# Patient Record
Sex: FEMALE | Race: WHITE | NOT HISPANIC OR LATINO | URBAN - METROPOLITAN AREA
[De-identification: names, ages, dates, MRNs, and addresses within clinical notes are randomized per-mention and may not be internally consistent; named-entity substitution may affect disease eponyms.]

---

## 2024-01-29 ENCOUNTER — HOSPITAL ENCOUNTER (INPATIENT)
Facility: HOSPITAL | Age: 29
LOS: 11 days | Discharge: HOME | DRG: 832 | End: 2024-02-09
Attending: PSYCHIATRY & NEUROLOGY | Admitting: PSYCHIATRY & NEUROLOGY
Payer: MEDICAID

## 2024-01-29 DIAGNOSIS — F32.A DEPRESSION: ICD-10-CM

## 2024-01-29 DIAGNOSIS — F32.A DEPRESSION, UNSPECIFIED DEPRESSION TYPE: Primary | ICD-10-CM

## 2024-01-29 PROCEDURE — 83036 HEMOGLOBIN GLYCOSYLATED A1C: CPT | Performed by: PSYCHIATRY & NEUROLOGY

## 2024-01-29 PROCEDURE — 11400000 HC PSYCH PRIVATE ROOM

## 2024-01-29 PROCEDURE — 80061 LIPID PANEL: CPT | Performed by: PSYCHIATRY & NEUROLOGY

## 2024-01-29 RX ORDER — ONDANSETRON 4 MG/1
4 TABLET, ORALLY DISINTEGRATING ORAL EVERY 8 HOURS PRN
Status: DISCONTINUED | OUTPATIENT
Start: 2024-01-29 | End: 2024-02-09 | Stop reason: HOSPADM

## 2024-01-29 RX ORDER — PROMETHAZINE HYDROCHLORIDE 25 MG/1
25 TABLET ORAL EVERY 6 HOURS PRN
Status: DISCONTINUED | OUTPATIENT
Start: 2024-01-29 | End: 2024-02-09 | Stop reason: HOSPADM

## 2024-01-29 RX ORDER — OLANZAPINE 10 MG/2ML
10 INJECTION, POWDER, FOR SOLUTION INTRAMUSCULAR EVERY 8 HOURS PRN
Status: DISCONTINUED | OUTPATIENT
Start: 2024-01-29 | End: 2024-02-09 | Stop reason: HOSPADM

## 2024-01-29 RX ORDER — IBUPROFEN 200 MG
1 TABLET ORAL DAILY PRN
Status: DISCONTINUED | OUTPATIENT
Start: 2024-01-29 | End: 2024-02-09 | Stop reason: HOSPADM

## 2024-01-29 RX ORDER — BENZTROPINE MESYLATE 1 MG/ML
2 INJECTION, SOLUTION INTRAMUSCULAR; INTRAVENOUS EVERY 8 HOURS PRN
Status: DISCONTINUED | OUTPATIENT
Start: 2024-01-29 | End: 2024-02-09 | Stop reason: HOSPADM

## 2024-01-29 RX ORDER — BENZONATATE 100 MG/1
100 CAPSULE ORAL 3 TIMES DAILY PRN
Status: DISCONTINUED | OUTPATIENT
Start: 2024-01-29 | End: 2024-02-09 | Stop reason: HOSPADM

## 2024-01-29 RX ORDER — ALUMINUM HYDROXIDE, MAGNESIUM HYDROXIDE, AND SIMETHICONE 1200; 120; 1200 MG/30ML; MG/30ML; MG/30ML
30 SUSPENSION ORAL EVERY 6 HOURS PRN
Status: DISCONTINUED | OUTPATIENT
Start: 2024-01-29 | End: 2024-02-09 | Stop reason: HOSPADM

## 2024-01-29 RX ORDER — FLUOXETINE HYDROCHLORIDE 20 MG/1
20 CAPSULE ORAL DAILY
Status: ON HOLD | COMMUNITY
End: 2024-01-30

## 2024-01-29 RX ORDER — HYDROXYZINE PAMOATE 50 MG/1
50 CAPSULE ORAL EVERY 6 HOURS PRN
Status: DISCONTINUED | OUTPATIENT
Start: 2024-01-29 | End: 2024-02-09 | Stop reason: HOSPADM

## 2024-01-29 RX ORDER — ACETAMINOPHEN 325 MG/1
650 TABLET ORAL EVERY 6 HOURS PRN
Status: DISCONTINUED | OUTPATIENT
Start: 2024-01-29 | End: 2024-02-09 | Stop reason: HOSPADM

## 2024-01-29 RX ORDER — OLANZAPINE 10 MG/1
10 TABLET ORAL EVERY 8 HOURS PRN
Status: DISCONTINUED | OUTPATIENT
Start: 2024-01-29 | End: 2024-02-09 | Stop reason: HOSPADM

## 2024-01-29 RX ORDER — LOPERAMIDE HYDROCHLORIDE 2 MG/1
2 CAPSULE ORAL
Status: DISCONTINUED | OUTPATIENT
Start: 2024-01-29 | End: 2024-02-09 | Stop reason: HOSPADM

## 2024-01-30 PROBLEM — Z34.90 PREGNANCY: Status: ACTIVE | Noted: 2024-01-30

## 2024-01-30 PROBLEM — F32.A DEPRESSION: Status: ACTIVE | Noted: 2024-01-30

## 2024-01-30 LAB
CHOLEST SERPL-MCNC: 270 MG/DL (ref 120–199)
CHOLEST/HDLC SERPL: 4.7 {RATIO} (ref 2–5)
ESTIMATED AVG GLUCOSE: 94 MG/DL (ref 68–131)
HBA1C MFR BLD: 4.9 % (ref 4–5.6)
HDLC SERPL-MCNC: 58 MG/DL (ref 40–75)
HDLC SERPL: 21.5 % (ref 20–50)
LDLC SERPL CALC-MCNC: 172.2 MG/DL (ref 63–159)
NONHDLC SERPL-MCNC: 212 MG/DL
TRIGL SERPL-MCNC: 199 MG/DL (ref 30–150)

## 2024-01-30 PROCEDURE — 99231 SBSQ HOSP IP/OBS SF/LOW 25: CPT | Mod: ,,, | Performed by: PHYSICIAN ASSISTANT

## 2024-01-30 PROCEDURE — 99223 1ST HOSP IP/OBS HIGH 75: CPT | Mod: ,,, | Performed by: PSYCHIATRY & NEUROLOGY

## 2024-01-30 PROCEDURE — 90836 PSYTX W PT W E/M 45 MIN: CPT | Mod: ,,, | Performed by: PSYCHIATRY & NEUROLOGY

## 2024-01-30 PROCEDURE — 11400000 HC PSYCH PRIVATE ROOM

## 2024-01-30 PROCEDURE — 25000003 PHARM REV CODE 250: Performed by: PSYCHIATRY & NEUROLOGY

## 2024-01-30 RX ORDER — PRENATAL WITH FERROUS FUM AND FOLIC ACID 3080; 920; 120; 400; 22; 1.84; 3; 20; 10; 1; 12; 200; 27; 25; 2 [IU]/1; [IU]/1; MG/1; [IU]/1; MG/1; MG/1; MG/1; MG/1; MG/1; MG/1; UG/1; MG/1; MG/1; MG/1; MG/1
1 TABLET ORAL DAILY
Status: DISCONTINUED | OUTPATIENT
Start: 2024-01-30 | End: 2024-02-09 | Stop reason: HOSPADM

## 2024-01-30 RX ORDER — SERTRALINE HYDROCHLORIDE 25 MG/1
25 TABLET, FILM COATED ORAL DAILY
Status: DISCONTINUED | OUTPATIENT
Start: 2024-01-30 | End: 2024-01-31

## 2024-01-30 RX ADMIN — PRENATAL VIT W/ FE FUMARATE-FA TAB 27-0.8 MG 1 TABLET: 27-0.8 TAB at 10:01

## 2024-01-30 RX ADMIN — SERTRALINE HYDROCHLORIDE 25 MG: 25 TABLET ORAL at 10:01

## 2024-01-30 RX ADMIN — HYDROXYZINE PAMOATE 50 MG: 50 CAPSULE ORAL at 09:01

## 2024-01-30 NOTE — NURSING
Patient arrived to [facility] from [facility]. Via wheelchair, escorted by hospital security and MHT. PEC with patient. Scanned, no contraband found. Ambulated to assessment room without difficulty.

## 2024-01-30 NOTE — PROGRESS NOTES
"   01/30/24 1445   [facility] Group Therapy   Group Name Therapeutic Recreation   Specific Interventions Cognitive Stimulation Training   Participation Level Appropriate;Sharing   Participation Quality Cooperative;Social   Insight/Motivation Applies New Skills;Good   Affect/Mood Display Depressed   Cognition Alert   Psychomotor WNL     Patient presents cooperative, and willing to participate, reports a depressed, feel a little better" mood, verbalized she participated but "something was still running in the background." Patient reports she gets into a cycle of wandering, questioning and repetitive fixation... I notice my OCD coming back."   "

## 2024-01-30 NOTE — NURSING
OB TRIAGE ASSESSMENT    RE: Ellen Rose  MRN:  [id number]  :  1995  AGE:  28 y.o.    Date:  2024      NST:    150 BPM baseline  Variability:  Good {> 6 bpm)  Accelerations:  Reactive  Decelerations:  none    [place]: contractions irregular with uterine irritability    ED RN notified of results.      Neisha Basurto   24; 1700

## 2024-01-30 NOTE — PLAN OF CARE
Pt is calm and cooperative.  Denies any S/I or H/I.  Denies any N/V, just some mild body aches reported.  Denies abdominal pain.  Mood is better today, she reports feeling relieved since being admitted.  She is compliant and appreciative.  Explained to pt that OB has been consulted and they will be coming to assess her today as well, understanding verbalized.  She is interacting appropriately on the unit.  Demeanor is pleasant.  Contracted for safety.  No acute distress apparent at this time, will continue to monitor.

## 2024-01-30 NOTE — HPI
Patient is a 28 y.o. female with medical history of depression and pregnancy who was admitted to [facility] for suicidal ideation.  Hospital medicine consulted for medical management.

## 2024-01-30 NOTE — MEDICAL/APP STUDENT
PSYCHIATRY INPATIENT ADMISSION NOTE - H & P      1/30/2024 8:16 AM   Ellen Rose   1995   [id number]         DATE OF ADMISSION: 1/29/2024  8:30 PM    SITE: [facility]    CURRENT LEGAL STATUS: PEC and/or CEC      HISTORY    CHIEF COMPLAINT   Ellen Rose is a 28 y.o. female with a past psychiatric history of {psych axis 1:98968} currently admitted to the inpatient unit with the following chief complaint: {AMB PSY INITIAL COMPLAINT:20458}    HPI   The patient was seen and examined. The chart was reviewed.    The patient presented to the ER on 1/29/2024 .    The patient was medically cleared and admitted to the U.    Ellen is a 29yo woman who is 35 weeks primagravida. Pt has a significant past psych history of OCD with a hospitalization at age 25. Pt had been diagnosed with OCD that was well managed with medications. Pt became pregnant and stopped taking medication on obstetricians advice early in pregnancy. Pt started to experience anxiety and depression around 20 weeks. For past 2 weeks ago she has been disabled by obsessional thought to a point where she does not want to get out of bed and is indifferent to life. Obsessions are so stressful that she does not want to wake up. She reports no plan to commit suicide or homicide but feels she would be better off not alive. Pt feels she needs significant support that cannot be provided by her support network and requires hospitalization. Pt reports to significant history of hoarding. Pt is  to  and has been experiencing marital stress due to hoarding. Father, mother, half-sister kristy as well.     ***      Symptoms of Depression: diminished mood - Yes, loss of interest/anhedonia - Yes;  recurrent - Yes, >14 days - Yes, diminished energy - Yes, change in sleep - No, change in appetite - No, diminished concentration or cognition or indecisiveness - No, PMA/R -  No, excessive guilt or hopelessness or worthlessness - Yes, suicidal ideations -  "Yes    Changes in Sleep: trouble with initiation- No, maintenance, - No early morning awakening with inability to return to sleep - No, hypersomnolence - No    Suicidal- active/passive ideations - Yes/passive, organized plans, future intentions - No    Homicidal ideations: active/passive ideations - No, organized plans, future intentions - No    Symptoms of psychosis: hallucinations - No, delusions - No, disorganized speech - No, disorganized behavior or abnormal motor behavior - No, or negative symptoms (diminshed emotional expression, avolition, anhedonia, alogia, asociality) - Yes, active phase symptoms >1 month - No5, continuous signs of illness > 6 months - No, since onset of illness decreased level of functioning present - Yes    Symptoms of macario or hypomania: elevated, expansive, or irritable mood with increased energy or activity - No; > 4 days - No,  >7 days - No; with inflated self-esteem or grandiosity - No, decreased need for sleep - No, increased rate of speech - No, FOI or racing thoughts - Yes, distractibility - No, increased goal directed activity or PMA - No, risky/disinhibited behavior - No    Symptoms of ABRIL: excessive anxiety/worry/fear, more days than not, about numerous issues - No, ongoing for >6 months - No, difficult to control - No, with restlessness - No, fatigue - No, poor concentration - No, irritability - No, muscle tension - No, sleep disturbance - No; causes functionally impairing distress - No    Symptoms of Panic Disorder: recurrent panic attacks (palpitations/heart racing, sweating, shakiness, dyspnea, choking, chest pain/discomfort, Gi symptoms, dizzy/lightheadedness, hot/col flashes, paresthesias, derealization, fear of losing control or fear of dying or fear of "going crazy") - Yes, precipitated - Yes, un-precipitated - No, source of worry and/or behavioral changes secondary for 1 month or longer- Yes, agoraphobia - No    Symptoms of PTSD: h/o trauma exposure - No; " re-experiencing/intrusive symptoms - No, avoidant behavior - No, 2 or more negative alterations in cognition or mood - No, 2 or more hyperarousal symptoms - No; with dissociative symptoms - No, ongoing for 1 or more  months - No    Symptoms of OCD: obsessions (recurrent thoughts/urges/images; intrusive and/or unwanted; uses other thoughts/actions to suppress) - Yes; compulsions (repetitive behaviors used to lower distress/anxiety/obsessions) - Yes, time-consuming (over 1 hour per day) or cause significant distress/impairment - - Yes    Symptoms of Anorexia: restriction of caloric intake leading to significantly low body weight - No, intense fear of gaining weight or persistent behavior that interferes with weight gain even thought at a significantly low weight - No, disturbance in the way in which one's body weight or shape is experienced, undue influence of body weight or shape on self evaluation, or persistent lack of recognition of the seriousness of the current low body weight - No    Symptoms of Bulimia: recurrent episodes of binge eating (definitely larger amount  than what others would eat and lack of a sense of control over eating during episode) - No, recurrent inappropriate compensatory behaviors in order to prevent weight gain (fasting, medications, exercise, vomiting) - No, binges and compensatory behaviors both occur on average at least once a week for 3 months - No, self evaluations is unduly influenced by body shape/weight- - No    Symptoms of Binge eating: recurrent episodes of binge eating (definitely larger amount than what others would eat and lack of a sense of control over eating during episode) - No, 3 or more of following (eating much more rapidly, eating until uncomfortably full, large amounts when not hungry, eating alone because of embarrassed by how much,  feeling disgusted with oneself, depressed or very guilty afterward) - No, distress regarding binges - No, binges occur on average at  least once a week for 3 months - No      Substance/s:  Taken in larger amounts or over longer periods than intended: No,  Persistent desire or unsuccessful attempts to cut down or stop: No,  Great deal of time spent seeking, using or recovering from: No,  Craving or strong desire to use: No,  Recurrent use despite failure to meet major role obligation: No,  Continued use despite persistent or recurrent social/interparsonal issues due to use: No,  Important social/work/recreational activities given up due to use: No,  Recurrent use in physically hazardous situations: No,  Continued use despite knowledge of persistent physical or psychological problem: No,  Tolerance (either increased need or diminished effect): No,      Psychotherapy:  Target symptoms: anxiety   Why chosen therapy is appropriate versus another modality: {reason:79896}  Outcome monitoring methods: {methods:34073}  Therapeutic intervention type: {types:93879}  Topics discussed/themes: {Topics:20403}  The patient's response to the intervention is {PSY INTERVENTION RESPONSE:01654}. The patient's progress toward treatment goals is {Progress:20262}.   Duration of intervention: *** minutes.      PAST PSYCHIATRIC HISTORY  Previous Psychiatric Hospitalizations: Yes  Previous SI/HI: Yes,  Previous Suicide Attempts: No,   Previous Medication Trials: No,  Psychiatric Care (current & past): Yes,  History of Psychotherapy: Yes,  History of Violence: No,  History of sexual/physical abuse: No,    PAST MEDICAL & SURGICAL HISTORY   Past Medical History:   Diagnosis Date    Major depressive disorder, single episode, unspecified     OCD (obsessive compulsive disorder)      No past surgical history on file.  ***    CURRENT PSYCH MEDICATION REGIMEN   ***  Current Medication side effects:  ***  Current Medication compliance:  ***    Previous psych meds trials  ******    Home Meds:   Prior to Admission medications    Medication Sig Start Date End Date Taking? Authorizing  Provider   FLUoxetine 20 MG capsule Take 20 mg by mouth once daily.    Provider, Historical       ***  OTC Meds: ***    Scheduled Meds:    PRN Meds: acetaminophen, aluminum-magnesium hydroxide-simethicone, benzonatate, benztropine mesylate, hydrOXYzine pamoate, loperamide, nicotine, OLANZapine **AND** OLANZapine, ondansetron, promethazine   Psychotherapeutics (From admission, onward)      Start     Stop Route Frequency Ordered    01/29/24 2053  OLANZapine tablet 10 mg  (Olanzapine PRN (</= 66 yo))        See Hyperspace for full Linked Orders Report.    -- Oral Every 8 hours PRN 01/29/24 2053 01/29/24 2053  OLANZapine injection 10 mg  (Olanzapine PRN (</= 66 yo))        See Hyperspace for full Linked Orders Report.    -- IM Every 8 hours PRN 01/29/24 2053            ALLERGIES   Review of patient's allergies indicates:  No Known Allergies    NEUROLOGIC HISTORY  Seizures: No  Head trauma: No    SOCIAL HISTORY:  Developmental/Childhood:Delayed in achieving developmental milestone walked late  Education:High School Diploma  Employment Status/Finances:Employed   Relationship Status/Sexual Orientation:   Children: 0  Housing Status: Home    history:  NO   Access to Firearms: YES:stoerd at mothers home ***    ;  Locked up? NO  Pentecostalism:Agnostic  Recreational activities:***outdoors crafting    SUBSTANCE ABUSE HISTORY   Recreational Drugs: {recreationaldrugs:64523}   Use of Alcohol: minimal use  Rehab History:no   Tobacco Use:no    LEGAL HISTORY:   Past charges/incarcerations: NO  Pending charges:NO    FAMILY PSYCHIATRIC HISTORY   History reviewed. No pertinent family history.    ***       ROS  ROS      EXAMINATION    PHYSICAL EXAM  Reviewed note/exam by  *** from *** at ***    VITALS   Vitals:    01/30/24 0800   BP: (!) 101/59   Pulse: 99   Resp: 19   Temp: 97.4 °F (36.3 °C)        Body mass index is 34.27 kg/m².        PAIN  2/10 mid back pain  Subjective report of pain matches objective signs and  symptoms: Yes    LABORATORY DATA   Recent Results (from the past 72 hour(s))   Urinalysis, Reflex to Urine Culture Urine, Clean Catch    Collection Time: 01/29/24  4:41 PM    Specimen: Urine   Result Value Ref Range    Specimen UA Urine, Clean Catch     Color, UA Luna Yellow, Straw, Luna    Appearance, UA Hazy (A) Clear    pH, UA 7.0 5.0 - 8.0    Specific Gravity, UA >=1.030 (A) 1.005 - 1.030    Protein, UA 1+ (A) Negative    Glucose, UA Trace (A) Negative    Ketones, UA 3+ (A) Negative    Bilirubin (UA) 1+ (A) Negative    Occult Blood UA Trace (A) Negative    Nitrite, UA Negative Negative    Urobilinogen, UA 1.0 <2.0 EU/dL    Leukocytes, UA Trace (A) Negative   Drug screen panel, emergency    Collection Time: 01/29/24  4:41 PM   Result Value Ref Range    Benzodiazepines Negative Negative    Methadone metabolites Negative Negative    Cocaine (Metab.) Negative Negative    Opiate Scrn, Ur Negative Negative    Barbiturate Screen, Ur Negative Negative    Amphetamine Screen, Ur Negative Negative    THC Negative Negative    Phencyclidine Negative Negative    Creatinine, Urine 260.7 15.0 - 325.0 mg/dL    Toxicology Information SEE COMMENT    Pregnancy, urine rapid    Collection Time: 01/29/24  4:41 PM   Result Value Ref Range    Preg Test, Ur Positive (A)    Urinalysis Microscopic    Collection Time: 01/29/24  4:41 PM   Result Value Ref Range    RBC, UA 2 0 - 4 /hpf    WBC, UA 11 (H) 0 - 5 /hpf    Bacteria Moderate (A) None-Occ /hpf    Squam Epithel, UA 50 /hpf    Hyaline Casts, UA 0 0-1/lpf /lpf    Microscopic Comment SEE COMMENT    CBC auto differential    Collection Time: 01/29/24  5:03 PM   Result Value Ref Range    WBC 7.56 3.90 - 12.70 K/uL    RBC 3.92 (L) 4.00 - 5.40 M/uL    Hemoglobin 11.6 (L) 12.0 - 16.0 g/dL    Hematocrit 34.3 (L) 37.0 - 48.5 %    MCV 88 82 - 98 fL    MCH 29.6 27.0 - 31.0 pg    MCHC 33.8 32.0 - 36.0 g/dL    RDW 12.8 11.5 - 14.5 %    Platelets 344 150 - 450 K/uL    MPV 10.1 9.2 - 12.9 fL     "Immature Granulocytes 0.4 0.0 - 0.5 %    Gran # (ANC) 5.6 1.8 - 7.7 K/uL    Immature Grans (Abs) 0.03 0.00 - 0.04 K/uL    Lymph # 1.3 1.0 - 4.8 K/uL    Mono # 0.6 0.3 - 1.0 K/uL    Eos # 0.0 0.0 - 0.5 K/uL    Baso # 0.02 0.00 - 0.20 K/uL    nRBC 0 0 /100 WBC    Gran % 73.8 (H) 38.0 - 73.0 %    Lymph % 17.6 (L) 18.0 - 48.0 %    Mono % 7.8 4.0 - 15.0 %    Eosinophil % 0.1 0.0 - 8.0 %    Basophil % 0.3 0.0 - 1.9 %    Differential Method Automated    Comprehensive metabolic panel    Collection Time: 01/29/24  5:03 PM   Result Value Ref Range    Sodium 138 136 - 145 mmol/L    Potassium 3.9 3.5 - 5.1 mmol/L    Chloride 107 95 - 110 mmol/L    CO2 19 (L) 23 - 29 mmol/L    Glucose 116 (H) 70 - 110 mg/dL    BUN 9 6 - 20 mg/dL    Creatinine 0.7 0.5 - 1.4 mg/dL    Calcium 9.0 8.7 - 10.5 mg/dL    Total Protein 6.3 6.0 - 8.4 g/dL    Albumin 2.6 (L) 3.5 - 5.2 g/dL    Total Bilirubin 0.6 0.1 - 1.0 mg/dL    Alkaline Phosphatase 98 55 - 135 U/L    AST 50 (H) 10 - 40 U/L    ALT 90 (H) 10 - 44 U/L    eGFR >60 >60 mL/min/1.73 m^2    Anion Gap 12 8 - 16 mmol/L   TSH    Collection Time: 01/29/24  5:03 PM   Result Value Ref Range    TSH 1.210 0.400 - 4.000 uIU/mL   Ethanol    Collection Time: 01/29/24  5:03 PM   Result Value Ref Range    Alcohol, Serum <10 <10 mg/dL   Acetaminophen level    Collection Time: 01/29/24  5:03 PM   Result Value Ref Range    Acetaminophen (Tylenol), Serum <3.0 (L) 10.0 - 20.0 ug/mL   Lipid Panel    Collection Time: 01/29/24  5:03 PM   Result Value Ref Range    Cholesterol 270 (H) 120 - 199 mg/dL    Triglycerides 199 (H) 30 - 150 mg/dL    HDL 58 40 - 75 mg/dL    LDL Cholesterol 172.2 (H) 63.0 - 159.0 mg/dL    HDL/Cholesterol Ratio 21.5 20.0 - 50.0 %    Total Cholesterol/HDL Ratio 4.7 2.0 - 5.0    Non-HDL Cholesterol 212 mg/dL      No results found for: "PHENYTOIN", "PHENOBARB", "VALPROATE", "CBMZ"        CONSTITUTIONAL  General Appearance: unremarkable, age appropriate, normal weight, well nourished, casually " dressed    MUSCULOSKELETAL  Muscle Strength and Tone:no tremor, no tic  Abnormal Involuntary Movements: No  Gait and Station: non-ataxic    PSYCHIATRIC   Level of Consciousness: awake and alert   Orientation: person, place, time, and situation  Grooming: Casually dressed and Well groomed  Psychomotor Behavior: normal, cooperative, friendly and cooperative, eye contact normal  Speech: normal tone, normal rate, normal pitch, normal volume  Language: grossly intact  Mood: neutral and sad  Affect: Consistent with mood  Thought Process: linear, logical  Associations: intact   Thought Content: denies SI and denies HI  Perceptions: {toperceptions:19977}  Memory: Able to recall past events, Remote intact, and Recent intact  Attention:Attends to interview without distraction  Fund of Knowledge: Aware of current events and Vocabulary appropriate   Estimate if Intelligence:  Average based on work/education history, vocabulary and mental status exam  Insight: has awareness of illness  Judgment: behavior is adequate to circumstances      PSYCHOSOCIAL    PSYCHOSOCIAL STRESSORS   family, financial, marital, and occupational    FUNCTIONING RELATIONSHIPS   good support system, strained with spouse or significant others, and good relationship with spouse or significant other    STRENGTHS AND LIABILITIES   Strength: Patient accepts guidance/feedback, Strength: Patient is expressive/articulate., Strength: Patient is intelligent., Strength: Patient is motivated for change., Strength: Patient is physically healthy., Strength: Patient has positive support network., Strength: Patient has reasonable judgment., Strength: Patient is stable.    Is the patient aware of the biomedical complications associated with substance abuse and mental illness? yes    Does the patient have an Advance Directive for Mental Health treatment? no  (If yes, inform patient to bring copy.)        MEDICAL DECISION MAKING        ASSESSMENT       ***      PROBLEM LIST  AND MANAGEMENT PLANS    ***      PRESCRIPTION DRUG MANAGEMENT  Compliance: {Responses; yes/no/unknown:74}  Side Effects: {Responses; yes/no/unknown:74}  Regimen Adjustments: see above    Discussed diagnosis, risks and benefits of proposed treatment vs alternative treatments vs no treatment, potential side effects of these treatments and the inherent unpredictability of treatment. The patient expresses understanding of the above and displays the capacity to agree with this treatment given said understanding. Patient also agrees that, currently, the benefits outweigh the risks and would like to pursue/continue treatment at this time.    Any medications being used off-label were discussed with the patient inclusive of the evidence base for the use of the medications and consent was obtained for the off-label use of the medication.         DIAGNOSTIC TESTING  Labs reviewed with patient; follow up pending labs    Disposition:  -Will attempt to obtain outside psychiatric records if available  -SW to assist with aftercare planning and collateral  -Once stable discharge home with outpatient follow up care and/or rehab  -Continue inpatient treatment under a PEC and/or CEC for danger to self/ danger to others/grave disability as evident by {PSY IP JUSTIFICATION FOR CONTINUED ACUTE CARE HOSPITALIZATION:86659}        Diaz Justice, MS3  Psychiatry

## 2024-01-30 NOTE — NURSING
Pt sleeping at this time, slept 4.5 hrs with no awakenings. NAD. Resp even and unlabored.Pathways clear,bed in low position. Q 15 min safety check ongoing.All precautions maintained.

## 2024-01-30 NOTE — CONSULTS
[facility] Hospital Medicine  Consult Note    Patient Name: Ellen Rose  MRN: [id number]  Admission Date: 1/29/2024  Hospital Length of Stay: 1 days  Attending Physician: Ray Sumner MD   Primary Care Provider: [facility]           Patient information was obtained from patient and ER records.     Inpatient consult to [facility] for History and Physical  Consult performed by: Shannon Swan PA-C  Consult ordered by: Ray Sumner MD        Subjective:     Principal Problem: Depression    Chief Complaint: No chief complaint on file.       HPI: Patient is a 28 y.o. female with medical history of depression and pregnancy who was admitted to [facility] for suicidal ideation.  Hospital medicine consulted for medical management.        Past Medical History:   Diagnosis Date    Major depressive disorder, single episode, unspecified     OCD (obsessive compulsive disorder)        No past surgical history on file.    Review of patient's allergies indicates:  No Known Allergies    Current Facility-Administered Medications on File Prior to Encounter   Medication    [DISCONTINUED] cefpodoxime tablet 200 mg     Current Outpatient Medications on File Prior to Encounter   Medication Sig    [DISCONTINUED] FLUoxetine 20 MG capsule Take 20 mg by mouth once daily.     Family History    None       Tobacco Use    Smoking status: Never    Smokeless tobacco: Never   Substance and Sexual Activity    Alcohol use: Not Currently    Drug use: Not Currently    Sexual activity: Yes     Partners: Male     Review of Systems   Constitutional:  Negative for chills and fever.   HENT:  Negative for congestion and drooling.    Respiratory:  Negative for cough and shortness of breath.    Cardiovascular:  Negative for chest pain and leg swelling.   Gastrointestinal:  Negative for constipation, diarrhea, nausea and vomiting.   Genitourinary:  Negative for difficulty urinating and dysuria.    Musculoskeletal:  Negative for arthralgias and gait problem.     Objective:     Vital Signs (Most Recent):  Temp: 97.4 °F (36.3 °C) (01/30/24 0800)  Pulse: 99 (01/30/24 0800)  Resp: 19 (01/30/24 0800)  BP: (!) 101/59 (01/30/24 0800)  SpO2: 98 % (01/29/24 2051) Vital Signs (24h Range):  Temp:  [97.3 °F (36.3 °C)-97.4 °F (36.3 °C)] 97.4 °F (36.3 °C)  Pulse:  [] 99  Resp:  [18-20] 19  SpO2:  [97 %-99 %] 98 %  BP: (101-125)/(59-75) 101/59     Weight: 79.6 kg (175 lb 7.8 oz)  Body mass index is 34.27 kg/m².     Physical Exam  Constitutional:       Appearance: Normal appearance.   HENT:      Head: Normocephalic and atraumatic.   Cardiovascular:      Rate and Rhythm: Normal rate and regular rhythm.   Pulmonary:      Effort: Pulmonary effort is normal. No respiratory distress.      Breath sounds: Normal breath sounds.   Abdominal:      General: Abdomen is flat. There is no distension.      Palpations: Abdomen is soft.   Musculoskeletal:      Right lower leg: No edema.      Left lower leg: No edema.   Skin:     General: Skin is warm and dry.   Neurological:      Mental Status: She is alert and oriented to person, place, and time. Mental status is at baseline.          Significant Labs: UPT  Positive UPT   U/A  Negative for UTI   UDS  Results for orders placed or performed during the hospital encounter of 01/29/24   Drug screen panel, emergency   Result Value Ref Range    Benzodiazepines Negative Negative    Methadone metabolites Negative Negative    Cocaine (Metab.) Negative Negative    Opiate Scrn, Ur Negative Negative    Barbiturate Screen, Ur Negative Negative    Amphetamine Screen, Ur Negative Negative    THC Negative Negative    Phencyclidine Negative Negative    Creatinine, Urine 260.7 15.0 - 325.0 mg/dL    Toxicology Information SEE COMMENT      CBC  Results for orders placed or performed during the hospital encounter of 01/29/24   CBC auto differential   Result Value Ref Range    WBC 7.56 3.90 - 12.70 K/uL     RBC 3.92 (L) 4.00 - 5.40 M/uL    Hemoglobin 11.6 (L) 12.0 - 16.0 g/dL    Hematocrit 34.3 (L) 37.0 - 48.5 %    MCV 88 82 - 98 fL    MCH 29.6 27.0 - 31.0 pg    MCHC 33.8 32.0 - 36.0 g/dL    RDW 12.8 11.5 - 14.5 %    Platelets 344 150 - 450 K/uL    MPV 10.1 9.2 - 12.9 fL    Immature Granulocytes 0.4 0.0 - 0.5 %    Gran # (ANC) 5.6 1.8 - 7.7 K/uL    Immature Grans (Abs) 0.03 0.00 - 0.04 K/uL    Lymph # 1.3 1.0 - 4.8 K/uL    Mono # 0.6 0.3 - 1.0 K/uL    Eos # 0.0 0.0 - 0.5 K/uL    Baso # 0.02 0.00 - 0.20 K/uL    nRBC 0 0 /100 WBC    Gran % 73.8 (H) 38.0 - 73.0 %    Lymph % 17.6 (L) 18.0 - 48.0 %    Mono % 7.8 4.0 - 15.0 %    Eosinophil % 0.1 0.0 - 8.0 %    Basophil % 0.3 0.0 - 1.9 %    Differential Method Automated      CMP  Results for orders placed or performed during the hospital encounter of 01/29/24   Comprehensive metabolic panel   Result Value Ref Range    Sodium 138 136 - 145 mmol/L    Potassium 3.9 3.5 - 5.1 mmol/L    Chloride 107 95 - 110 mmol/L    CO2 19 (L) 23 - 29 mmol/L    Glucose 116 (H) 70 - 110 mg/dL    BUN 9 6 - 20 mg/dL    Creatinine 0.7 0.5 - 1.4 mg/dL    Calcium 9.0 8.7 - 10.5 mg/dL    Total Protein 6.3 6.0 - 8.4 g/dL    Albumin 2.6 (L) 3.5 - 5.2 g/dL    Total Bilirubin 0.6 0.1 - 1.0 mg/dL    Alkaline Phosphatase 98 55 - 135 U/L    AST 50 (H) 10 - 40 U/L    ALT 90 (H) 10 - 44 U/L    eGFR >60 >60 mL/min/1.73 m^2    Anion Gap 12 8 - 16 mmol/L     TSH  Results for orders placed or performed during the hospital encounter of 01/29/24   TSH   Result Value Ref Range    TSH 1.210 0.400 - 4.000 uIU/mL     ETOH  Results for orders placed or performed during the hospital encounter of 01/29/24   Ethanol   Result Value Ref Range    Alcohol, Serum <10 <10 mg/dL     Salicylate  No results found for this or any previous visit.  Acetaminophen  Results for orders placed or performed during the hospital encounter of 01/29/24   Acetaminophen level   Result Value Ref Range    Acetaminophen (Tylenol), Serum <3.0 (L) 10.0  - 20.0 ug/mL             Significant Imaging: none  Assessment/Plan:     * Depression  Defer to psych         Pregnancy  Positive UPT  F/u with outpatient obgyn        VTE Risk Mitigation (From admission, onward)      None                Thank you for your consult. I will sign off. Please contact us if you have any additional questions.    Shannon Swan PA-C  Department of Hospital Medicine   [facility]

## 2024-01-30 NOTE — NURSING
"Patient reports to [facility] for 7/10 depression and 5/10 anxiety. Reports that as a child she had OCD tendencies. However, as an adult she has been more depressed due to trying to control her OCD symptoms. She reports that she ruminates on conversations that happened years ago and she could not handle her symptoms. When she got pregnant, she had to get off of her medications (fluvoxamine, hydroxyzine, bupropion, and Ambien). These medications were substituted with fluoxetine. She has been to the ED twice in her community for psych placement, but states that nobody can accept her due to being pregnant. They placed her on hydroxyzine PRN and Unisom each evening, but they have not worked well for her. Upon getting closer to her delivery date, she is becoming anxious and "numb" to giving birth. Reports that she wishes she could go to sleep and not wake up, but does not have any plans to harm herself. States that she sometimes has resentment towards her baby because "now I can't do anything even if I wanted to". She has a history of being on a U for her OCD and depression. Denies HI. Denies hallucinations. Reports decreased sleep and appetite. UDS negative. No alcohol use or smoking. Oriented pt to unit. NADN. Remains calm and cooperative. Safety precautions remain in place.  "

## 2024-01-30 NOTE — H&P
"PSYCHIATRY INPATIENT ADMISSION NOTE - H & P      2024 8:45 AM   Ellen Rose   1995   [id number]         DATE OF ADMISSION: 2024  8:30 PM    SITE: [facility]    CURRENT LEGAL STATUS: PEC and/or CEC      HISTORY    CHIEF COMPLAINT   Ellen Rose is a 28 y.o. female with a past psychiatric history of OCD and depression currently admitted to the inpatient unit with the following chief complaint: obsessive thinking and depression with passive SI, "I needed to get back on medications."    HPI   The patient was seen and examined. The chart was reviewed.    The patient presented to the ER on 2024 . Per staff notes:  -Patient is 35 weeks pregnant, comes to ER today states she feels like she needs additional meds for her OCD and depression, states she does have a feeling of haplessness and would not mind never waking up again   -28-year-old female  at 35 weeks presenting with depression and worsening OCD tendencies.  Patient reports that she was taken off her psychiatric medications prior to her pregnancy, but in the last few weeks has been feeling more depressed.  She states that she feels resentment towards her unborn child, and has a feeling of not wanting to wake up in the morning.  Patient denies any abdominal pain, vaginal bleeding, vaginal discharge.  No other complaints.   -Patient reports to [facility] for 7/10 depression and 5/10 anxiety. Reports that as a child she had OCD tendencies. However, as an adult she has been more depressed due to trying to control her OCD symptoms. She reports that she ruminates on conversations that happened years ago and she could not handle her symptoms. When she got pregnant, she had to get off of her medications (fluvoxamine, hydroxyzine, bupropion, and Ambien). These medications were substituted with fluoxetine. She has been to the ED twice in her community for psych placement, but states that nobody can accept her due to being pregnant. They placed her on " "hydroxyzine PRN and Unisom each evening, but they have not worked well for her. Upon getting closer to her delivery date, she is becoming anxious and "numb" to giving birth. Reports that she wishes she could go to sleep and not wake up, but does not have any plans to harm herself. States that she sometimes has resentment towards her baby because "now I can't do anything even if I wanted to". She has a history of being on a BHU for her OCD and depression. Denies HI. Denies hallucinations. Reports decreased sleep and appetite. UDS negative. No alcohol use or smoking.     The patient was medically cleared and admitted to the U.    The patient reports long standing issues with OCD and periodic depression. She was doing well until she got off of her medications secondly to pregnancy (currently at about 35 weeks; first pregnancy) . Symptoms recurred over the last month or so and quickly progressed. She developed passive SI and uncontrolled obsessive thinking.     She plans to try breastfeeding.     Symptoms of Depression: diminished mood - Yes, loss of interest/anhedonia - Yes;  recurrent - Yes, >14 days - Yes, diminished energy - Yes, change in sleep - No, change in appetite - No, diminished concentration or cognition or indecisiveness - No, PMA/R -  No, excessive guilt or hopelessness or worthlessness - Yes, suicidal ideations - Yes     Changes in Sleep: trouble with initiation- No, maintenance, - No early morning awakening with inability to return to sleep - No, hypersomnolence - No     Suicidal- active/passive ideations - Yes/passive, no active, organized plans- no, future intentions - No     Homicidal ideations: active/passive ideations - No, organized plans- no, future intentions - No     Symptoms of psychosis: hallucinations - No, delusions - No, disorganized speech - No, disorganized behavior or abnormal motor behavior - No, or negative symptoms (diminshed emotional expression, avolition, anhedonia, alogia, " "asociality) - no, active phase symptoms >1 month - No, continuous signs of illness > 6 months - No, since onset of illness decreased level of functioning present - no     Symptoms of macario or hypomania: elevated, expansive, or irritable mood with increased energy or activity - No; > 4 days - No,  >7 days - No; with inflated self-esteem or grandiosity - No, decreased need for sleep - No, increased rate of speech - No, FOI or racing thoughts - Yes, distractibility - No, increased goal directed activity or PMA - No, risky/disinhibited behavior - No     Symptoms of ABRIL: excessive anxiety/worry/fear, more days than not, about numerous issues - No, ongoing for >6 months - No, difficult to control - No, with restlessness - No, fatigue - No, poor concentration - No, irritability - No, muscle tension - No, sleep disturbance - No; causes functionally impairing distress - No     Symptoms of Panic Disorder: recurrent panic attacks (palpitations/heart racing, sweating, shakiness, dyspnea, choking, chest pain/discomfort, Gi symptoms, dizzy/lightheadedness, hot/col flashes, paresthesias, derealization, fear of losing control or fear of dying or fear of "going crazy") - Yes, precipitated - Yes, un-precipitated - No, source of worry and/or behavioral changes secondary for 1 month or longer- Yes, agoraphobia - No     Symptoms of PTSD: h/o trauma exposure - No; re-experiencing/intrusive symptoms - No, avoidant behavior - No, 2 or more negative alterations in cognition or mood - No, 2 or more hyperarousal symptoms - No; with dissociative symptoms - No, ongoing for 1 or more  months - No     Symptoms of OCD: obsessions (recurrent thoughts/urges/images; intrusive and/or unwanted; uses other thoughts/actions to suppress) - Yes; compulsions (repetitive behaviors used to lower distress/anxiety/obsessions) - Yes, time-consuming (over 1 hour per day) or cause significant distress/impairment - Yes     Symptoms of Anorexia: restriction of " caloric intake leading to significantly low body weight - No, intense fear of gaining weight or persistent behavior that interferes with weight gain even thought at a significantly low weight - No, disturbance in the way in which one's body weight or shape is experienced, undue influence of body weight or shape on self evaluation, or persistent lack of recognition of the seriousness of the current low body weight - No     Symptoms of Bulimia: recurrent episodes of binge eating (definitely larger amount  than what others would eat and lack of a sense of control over eating during episode) - No, recurrent inappropriate compensatory behaviors in order to prevent weight gain (fasting, medications, exercise, vomiting) - No, binges and compensatory behaviors both occur on average at least once a week for 3 months - No, self evaluations is unduly influenced by body shape/weight- - No     Symptoms of Binge eating: recurrent episodes of binge eating (definitely larger amount than what others would eat and lack of a sense of control over eating during episode) - No, 3 or more of following (eating much more rapidly, eating until uncomfortably full, large amounts when not hungry, eating alone because of embarrassed by how much,  feeling disgusted with oneself, depressed or very guilty afterward) - No, distress regarding binges - No, binges occur on average at least once a week for 3 months - No        Substance/s:  Taken in larger amounts or over longer periods than intended: No,  Persistent desire or unsuccessful attempts to cut down or stop: No,  Great deal of time spent seeking, using or recovering from: No,  Craving or strong desire to use: No,  Recurrent use despite failure to meet major role obligation: No,  Continued use despite persistent or recurrent social/interparsonal issues due to use: No,  Important social/work/recreational activities given up due to use: No,  Recurrent use in physically hazardous situations:  No,  Continued use despite knowledge of persistent physical or psychological problem: No,  Tolerance (either increased need or diminished effect): No,        Psychotherapy:  Target symptoms: depression, anxiety   Why chosen therapy is appropriate versus another modality: relevant to diagnosis, patient responds to this modality, evidence based practice  Outcome monitoring methods: self-report, observation  Therapeutic intervention type: insight oriented psychotherapy, behavior modifying psychotherapy, supportive psychotherapy, interactive psychotherapy  Topics discussed/themes: building skills sets for symptom management, symptom recognition  The patient's response to the intervention is accepting. The patient's progress toward treatment goals is fair.   Duration of intervention: 40 minutes.      PAST PSYCHIATRIC HISTORY  Previous Psychiatric Hospitalizations: Yes  Previous SI/HI: Yes,  Previous Suicide Attempts: No,   Previous Medication Trials: No,  Psychiatric Care (current & past): Yes,  History of Psychotherapy: Yes,  History of Violence: No,  History of sexual/physical abuse: No,    PAST MEDICAL & SURGICAL HISTORY   Past Medical History:   Diagnosis Date    Major depressive disorder, single episode, unspecified     OCD (obsessive compulsive disorder)      No past surgical history on file.      CURRENT PSYCH MEDICATION REGIMEN   prozac  Current Medication side effects:  no  Current Medication compliance:  yes    Previous psych meds trials  Luvox, vistaril, ambien and others    Home Meds:   Prior to Admission medications    Medication Sig Start Date End Date Taking? Authorizing Provider   FLUoxetine 20 MG capsule Take 20 mg by mouth once daily.    Provider, Historical         OTC Meds: none    Scheduled Meds:    PRN Meds: acetaminophen, aluminum-magnesium hydroxide-simethicone, benzonatate, benztropine mesylate, hydrOXYzine pamoate, loperamide, nicotine, OLANZapine **AND** OLANZapine, ondansetron, promethazine    Psychotherapeutics (From admission, onward)      Start     Stop Route Frequency Ordered    01/29/24 2053  OLANZapine tablet 10 mg  (Olanzapine PRN (</= 64 yo))        See Hyperspace for full Linked Orders Report.    -- Oral Every 8 hours PRN 01/29/24 2053 01/29/24 2053  OLANZapine injection 10 mg  (Olanzapine PRN (</= 64 yo))        See Hyperspace for full Linked Orders Report.    -- IM Every 8 hours PRN 01/29/24 2053            ALLERGIES   Review of patient's allergies indicates:  No Known Allergies    NEUROLOGIC HISTORY  Seizures: No  Head trauma: No     SOCIAL HISTORY:  Developmental/Childhood:Delayed in achieving developmental milestone walked late  Education:High School Diploma  Employment Status/Finances:Employed   Relationship Status/Sexual Orientation:   Children: 0  Housing Status: Home    history:  NO   Access to Firearms: YES:stored at mothers home;  Locked up? yes  Restorationist:Agnostic  Recreational activities:outdoors ,crafting     SUBSTANCE ABUSE HISTORY   Recreational Drugs: denied   Use of Alcohol: minimal use  Rehab History:no   Tobacco Use:no     LEGAL HISTORY:   Past charges/incarcerations: NO  Pending charges:NO    FAMILY PSYCHIATRIC HISTORY   History reviewed. No pertinent family history.    none      ROS  General ROS: negative  Ophthalmic ROS: negative  ENT ROS: negative  Allergy and Immunology ROS: negative  Hematological and Lymphatic ROS: negative  Endocrine ROS: negative  Respiratory ROS: no cough, shortness of breath, or wheezing  Cardiovascular ROS: no chest pain or dyspnea on exertion  Gastrointestinal ROS: no abdominal pain, change in bowel habits, or black or bloody stools  Genito-Urinary ROS: no dysuria, trouble voiding, or hematuria  Musculoskeletal ROS: negative  Neurological ROS: no TIA or stroke symptoms  Dermatological ROS: negative        EXAMINATION    PHYSICAL EXAM  Reviewed note/exam by Dr. Jeffries from 1/29/24 at 4:27 PM; FM consulted for physical exam-  pending     VITALS   Vitals:    01/30/24 0800   BP: (!) 101/59   Pulse: 99   Resp: 19   Temp: 97.4 °F (36.3 °C)        Body mass index is 34.27 kg/m².        PAIN  0/10  Subjective report of pain matches objective signs and symptoms: Yes    LABORATORY DATA   Recent Results (from the past 72 hour(s))   Urinalysis, Reflex to Urine Culture Urine, Clean Catch    Collection Time: 01/29/24  4:41 PM    Specimen: Urine   Result Value Ref Range    Specimen UA Urine, Clean Catch     Color, UA Luna Yellow, Straw, Luna    Appearance, UA Hazy (A) Clear    pH, UA 7.0 5.0 - 8.0    Specific Gravity, UA >=1.030 (A) 1.005 - 1.030    Protein, UA 1+ (A) Negative    Glucose, UA Trace (A) Negative    Ketones, UA 3+ (A) Negative    Bilirubin (UA) 1+ (A) Negative    Occult Blood UA Trace (A) Negative    Nitrite, UA Negative Negative    Urobilinogen, UA 1.0 <2.0 EU/dL    Leukocytes, UA Trace (A) Negative   Drug screen panel, emergency    Collection Time: 01/29/24  4:41 PM   Result Value Ref Range    Benzodiazepines Negative Negative    Methadone metabolites Negative Negative    Cocaine (Metab.) Negative Negative    Opiate Scrn, Ur Negative Negative    Barbiturate Screen, Ur Negative Negative    Amphetamine Screen, Ur Negative Negative    THC Negative Negative    Phencyclidine Negative Negative    Creatinine, Urine 260.7 15.0 - 325.0 mg/dL    Toxicology Information SEE COMMENT    Pregnancy, urine rapid    Collection Time: 01/29/24  4:41 PM   Result Value Ref Range    Preg Test, Ur Positive (A)    Urinalysis Microscopic    Collection Time: 01/29/24  4:41 PM   Result Value Ref Range    RBC, UA 2 0 - 4 /hpf    WBC, UA 11 (H) 0 - 5 /hpf    Bacteria Moderate (A) None-Occ /hpf    Squam Epithel, UA 50 /hpf    Hyaline Casts, UA 0 0-1/lpf /lpf    Microscopic Comment SEE COMMENT    CBC auto differential    Collection Time: 01/29/24  5:03 PM   Result Value Ref Range    WBC 7.56 3.90 - 12.70 K/uL    RBC 3.92 (L) 4.00 - 5.40 M/uL    Hemoglobin 11.6 (L)  12.0 - 16.0 g/dL    Hematocrit 34.3 (L) 37.0 - 48.5 %    MCV 88 82 - 98 fL    MCH 29.6 27.0 - 31.0 pg    MCHC 33.8 32.0 - 36.0 g/dL    RDW 12.8 11.5 - 14.5 %    Platelets 344 150 - 450 K/uL    MPV 10.1 9.2 - 12.9 fL    Immature Granulocytes 0.4 0.0 - 0.5 %    Gran # (ANC) 5.6 1.8 - 7.7 K/uL    Immature Grans (Abs) 0.03 0.00 - 0.04 K/uL    Lymph # 1.3 1.0 - 4.8 K/uL    Mono # 0.6 0.3 - 1.0 K/uL    Eos # 0.0 0.0 - 0.5 K/uL    Baso # 0.02 0.00 - 0.20 K/uL    nRBC 0 0 /100 WBC    Gran % 73.8 (H) 38.0 - 73.0 %    Lymph % 17.6 (L) 18.0 - 48.0 %    Mono % 7.8 4.0 - 15.0 %    Eosinophil % 0.1 0.0 - 8.0 %    Basophil % 0.3 0.0 - 1.9 %    Differential Method Automated    Comprehensive metabolic panel    Collection Time: 01/29/24  5:03 PM   Result Value Ref Range    Sodium 138 136 - 145 mmol/L    Potassium 3.9 3.5 - 5.1 mmol/L    Chloride 107 95 - 110 mmol/L    CO2 19 (L) 23 - 29 mmol/L    Glucose 116 (H) 70 - 110 mg/dL    BUN 9 6 - 20 mg/dL    Creatinine 0.7 0.5 - 1.4 mg/dL    Calcium 9.0 8.7 - 10.5 mg/dL    Total Protein 6.3 6.0 - 8.4 g/dL    Albumin 2.6 (L) 3.5 - 5.2 g/dL    Total Bilirubin 0.6 0.1 - 1.0 mg/dL    Alkaline Phosphatase 98 55 - 135 U/L    AST 50 (H) 10 - 40 U/L    ALT 90 (H) 10 - 44 U/L    eGFR >60 >60 mL/min/1.73 m^2    Anion Gap 12 8 - 16 mmol/L   TSH    Collection Time: 01/29/24  5:03 PM   Result Value Ref Range    TSH 1.210 0.400 - 4.000 uIU/mL   Ethanol    Collection Time: 01/29/24  5:03 PM   Result Value Ref Range    Alcohol, Serum <10 <10 mg/dL   Acetaminophen level    Collection Time: 01/29/24  5:03 PM   Result Value Ref Range    Acetaminophen (Tylenol), Serum <3.0 (L) 10.0 - 20.0 ug/mL   Lipid Panel    Collection Time: 01/29/24  5:03 PM   Result Value Ref Range    Cholesterol 270 (H) 120 - 199 mg/dL    Triglycerides 199 (H) 30 - 150 mg/dL    HDL 58 40 - 75 mg/dL    LDL Cholesterol 172.2 (H) 63.0 - 159.0 mg/dL    HDL/Cholesterol Ratio 21.5 20.0 - 50.0 %    Total Cholesterol/HDL Ratio 4.7 2.0 - 5.0     "Non-HDL Cholesterol 212 mg/dL      No results found for: "PHENYTOIN", "PHENOBARB", "VALPROATE", "CBMZ"        CONSTITUTIONAL  General Appearance: unremarkable, age appropriate, normal weight, well nourished, casually dressed     MUSCULOSKELETAL  Muscle Strength and Tone:no tremor, no tic  Abnormal Involuntary Movements: No  Gait and Station: non-ataxic     PSYCHIATRIC   Level of Consciousness: awake and alert   Orientation: person, place, time, and situation  Grooming: Casually dressed and Well groomed  Psychomotor Behavior: normal, cooperative, friendly and cooperative, eye contact normal  Speech: normal tone, normal rate, normal pitch, normal volume  Language: grossly intact  Mood: neutral and sad  Affect: Consistent with mood  Thought Process: linear, logical  Associations: intact   Thought Content: denies SI and denies HI  Perceptions: denies AH and denies  VH  Memory: Able to recall past events, Remote intact, and Recent intact  Attention:Attends to interview without distraction  Fund of Knowledge: Aware of current events and Vocabulary appropriate   Estimate if Intelligence:  Average based on work/education history, vocabulary and mental status exam  Insight: has awareness of illness  Judgment: behavior is adequate to circumstances        PSYCHOSOCIAL     PSYCHOSOCIAL STRESSORS   family, financial, marital, and occupational     FUNCTIONING RELATIONSHIPS   good support system, strained with spouse or significant others, and good relationship with spouse or significant other     STRENGTHS AND LIABILITIES   Strength: Patient accepts guidance/feedback, Strength: Patient is expressive/articulate., Strength: Patient has positive support network., Liability: Patient is unstable., Liability: Patient lacks coping skills.    Is the patient aware of the biomedical complications associated with substance abuse and mental illness? yes    Does the patient have an Advance Directive for Mental Health treatment? no  (If yes, " inform patient to bring copy.)        MEDICAL DECISION MAKING        ASSESSMENT     MDD, recurrent, severe without psychotic features   OCD  Unspecified anxiety disorder (panic attacks)    Psychosocial stressors     IUP- approximately 35 weeks    Elevated liver enzymes  ELevated blood glucose       PROBLEM LIST AND MANAGEMENT PLANS    Depression: pt counseled  -start trial of Zoloft at 25 mg po q day    OCD: pt counseled  -zoloft as above    Anxiety: pt counseled  -zoloft as above  -vistaril prn    Psychosocial stressors: pt counseled  -SW consulted to assist with resources      IUP- approximately 35 weeks: pt counseled  -checking fetal heart tones  -will consult Ob as/if needed    Elevated liver enzymes: pt counseled    ELevated blood glucose: pt counseled  -check HgA1c      PRESCRIPTION DRUG MANAGEMENT  Compliance: yes  Side Effects: no  Regimen Adjustments: see above    Discussed diagnosis, risks and benefits of proposed treatment vs alternative treatments vs no treatment, potential side effects of these treatments and the inherent unpredictability of treatment. The patient expresses understanding of the above and displays the capacity to agree with this treatment given said understanding. Patient also agrees that, currently, the benefits outweigh the risks and would like to pursue/continue treatment at this time.    Any medications being used off-label were discussed with the patient inclusive of the evidence base for the use of the medications and consent was obtained for the off-label use of the medication.         DIAGNOSTIC TESTING  Labs reviewed with patient; follow up pending labs    Disposition:  -Will attempt to obtain outside psychiatric records if available  -SW to assist with aftercare planning and collateral  -Once stable discharge home with outpatient follow up care and/or rehab  -Continue inpatient treatment under a PEC and/or CEC for danger to self/ danger to others/grave disability as evident by  danger to self, gravely disabled, and suicidal ideation        Ray Sumner MD  Psychiatry

## 2024-01-30 NOTE — SUBJECTIVE & OBJECTIVE
Past Medical History:   Diagnosis Date    Major depressive disorder, single episode, unspecified     OCD (obsessive compulsive disorder)        No past surgical history on file.    Review of patient's allergies indicates:  No Known Allergies    Current Facility-Administered Medications on File Prior to Encounter   Medication    [DISCONTINUED] cefpodoxime tablet 200 mg     Current Outpatient Medications on File Prior to Encounter   Medication Sig    [DISCONTINUED] FLUoxetine 20 MG capsule Take 20 mg by mouth once daily.     Family History    None       Tobacco Use    Smoking status: Never    Smokeless tobacco: Never   Substance and Sexual Activity    Alcohol use: Not Currently    Drug use: Not Currently    Sexual activity: Yes     Partners: Male     Review of Systems   Constitutional:  Negative for chills and fever.   HENT:  Negative for congestion and drooling.    Respiratory:  Negative for cough and shortness of breath.    Cardiovascular:  Negative for chest pain and leg swelling.   Gastrointestinal:  Negative for constipation, diarrhea, nausea and vomiting.   Genitourinary:  Negative for difficulty urinating and dysuria.   Musculoskeletal:  Negative for arthralgias and gait problem.     Objective:     Vital Signs (Most Recent):  Temp: 97.4 °F (36.3 °C) (01/30/24 0800)  Pulse: 99 (01/30/24 0800)  Resp: 19 (01/30/24 0800)  BP: (!) 101/59 (01/30/24 0800)  SpO2: 98 % (01/29/24 2051) Vital Signs (24h Range):  Temp:  [97.3 °F (36.3 °C)-97.4 °F (36.3 °C)] 97.4 °F (36.3 °C)  Pulse:  [] 99  Resp:  [18-20] 19  SpO2:  [97 %-99 %] 98 %  BP: (101-125)/(59-75) 101/59     Weight: 79.6 kg (175 lb 7.8 oz)  Body mass index is 34.27 kg/m².     Physical Exam  Constitutional:       Appearance: Normal appearance.   HENT:      Head: Normocephalic and atraumatic.   Cardiovascular:      Rate and Rhythm: Normal rate and regular rhythm.   Pulmonary:      Effort: Pulmonary effort is normal. No respiratory distress.      Breath sounds:  Normal breath sounds.   Abdominal:      General: Abdomen is flat. There is no distension.      Palpations: Abdomen is soft.   Musculoskeletal:      Right lower leg: No edema.      Left lower leg: No edema.   Skin:     General: Skin is warm and dry.   Neurological:      Mental Status: She is alert and oriented to person, place, and time. Mental status is at baseline.          Significant Labs: UPT  Positive UPT   U/A  Negative for UTI   UDS  Results for orders placed or performed during the hospital encounter of 01/29/24   Drug screen panel, emergency   Result Value Ref Range    Benzodiazepines Negative Negative    Methadone metabolites Negative Negative    Cocaine (Metab.) Negative Negative    Opiate Scrn, Ur Negative Negative    Barbiturate Screen, Ur Negative Negative    Amphetamine Screen, Ur Negative Negative    THC Negative Negative    Phencyclidine Negative Negative    Creatinine, Urine 260.7 15.0 - 325.0 mg/dL    Toxicology Information SEE COMMENT      CBC  Results for orders placed or performed during the hospital encounter of 01/29/24   CBC auto differential   Result Value Ref Range    WBC 7.56 3.90 - 12.70 K/uL    RBC 3.92 (L) 4.00 - 5.40 M/uL    Hemoglobin 11.6 (L) 12.0 - 16.0 g/dL    Hematocrit 34.3 (L) 37.0 - 48.5 %    MCV 88 82 - 98 fL    MCH 29.6 27.0 - 31.0 pg    MCHC 33.8 32.0 - 36.0 g/dL    RDW 12.8 11.5 - 14.5 %    Platelets 344 150 - 450 K/uL    MPV 10.1 9.2 - 12.9 fL    Immature Granulocytes 0.4 0.0 - 0.5 %    Gran # (ANC) 5.6 1.8 - 7.7 K/uL    Immature Grans (Abs) 0.03 0.00 - 0.04 K/uL    Lymph # 1.3 1.0 - 4.8 K/uL    Mono # 0.6 0.3 - 1.0 K/uL    Eos # 0.0 0.0 - 0.5 K/uL    Baso # 0.02 0.00 - 0.20 K/uL    nRBC 0 0 /100 WBC    Gran % 73.8 (H) 38.0 - 73.0 %    Lymph % 17.6 (L) 18.0 - 48.0 %    Mono % 7.8 4.0 - 15.0 %    Eosinophil % 0.1 0.0 - 8.0 %    Basophil % 0.3 0.0 - 1.9 %    Differential Method Automated      CMP  Results for orders placed or performed during the hospital encounter of  01/29/24   Comprehensive metabolic panel   Result Value Ref Range    Sodium 138 136 - 145 mmol/L    Potassium 3.9 3.5 - 5.1 mmol/L    Chloride 107 95 - 110 mmol/L    CO2 19 (L) 23 - 29 mmol/L    Glucose 116 (H) 70 - 110 mg/dL    BUN 9 6 - 20 mg/dL    Creatinine 0.7 0.5 - 1.4 mg/dL    Calcium 9.0 8.7 - 10.5 mg/dL    Total Protein 6.3 6.0 - 8.4 g/dL    Albumin 2.6 (L) 3.5 - 5.2 g/dL    Total Bilirubin 0.6 0.1 - 1.0 mg/dL    Alkaline Phosphatase 98 55 - 135 U/L    AST 50 (H) 10 - 40 U/L    ALT 90 (H) 10 - 44 U/L    eGFR >60 >60 mL/min/1.73 m^2    Anion Gap 12 8 - 16 mmol/L     TSH  Results for orders placed or performed during the hospital encounter of 01/29/24   TSH   Result Value Ref Range    TSH 1.210 0.400 - 4.000 uIU/mL     ETOH  Results for orders placed or performed during the hospital encounter of 01/29/24   Ethanol   Result Value Ref Range    Alcohol, Serum <10 <10 mg/dL     Salicylate  No results found for this or any previous visit.  Acetaminophen  Results for orders placed or performed during the hospital encounter of 01/29/24   Acetaminophen level   Result Value Ref Range    Acetaminophen (Tylenol), Serum <3.0 (L) 10.0 - 20.0 ug/mL             Significant Imaging: none

## 2024-01-30 NOTE — PROGRESS NOTES
"   01/30/24 1030   [facility] Therapy   Group Name Therapeutic Recreation   Specific Interventions Cognitive Stimulation Training   Participation Level Appropriate;Attentive   Participation Quality Cooperative;Social   Insight/Motivation Applies New Skills;Improved   Affect/Mood Display Appropriate   Cognition Alert   Psychomotor WNL     Patient presents calm, cooperative, willing to explore new leisure options, reports a "better" mood, Patient worked well with peers, brainstormed to answers items during skilled activity, remained involved, shows friendly competition, enjoys the activity.  "

## 2024-01-31 PROCEDURE — 11400000 HC PSYCH PRIVATE ROOM

## 2024-01-31 PROCEDURE — 99233 SBSQ HOSP IP/OBS HIGH 50: CPT | Mod: ,,, | Performed by: PSYCHIATRY & NEUROLOGY

## 2024-01-31 PROCEDURE — 90833 PSYTX W PT W E/M 30 MIN: CPT | Mod: ,,, | Performed by: PSYCHIATRY & NEUROLOGY

## 2024-01-31 PROCEDURE — 25000003 PHARM REV CODE 250: Performed by: PSYCHIATRY & NEUROLOGY

## 2024-01-31 RX ORDER — SERTRALINE HYDROCHLORIDE 50 MG/1
50 TABLET, FILM COATED ORAL DAILY
Status: DISCONTINUED | OUTPATIENT
Start: 2024-02-01 | End: 2024-02-02

## 2024-01-31 RX ADMIN — PRENATAL VIT W/ FE FUMARATE-FA TAB 27-0.8 MG 1 TABLET: 27-0.8 TAB at 09:01

## 2024-01-31 RX ADMIN — HYDROXYZINE PAMOATE 50 MG: 50 CAPSULE ORAL at 09:01

## 2024-01-31 RX ADMIN — SERTRALINE HYDROCHLORIDE 25 MG: 25 TABLET ORAL at 09:01

## 2024-01-31 NOTE — PLAN OF CARE
"Behavioral Health Unit  Psychosocial History and Assessment  Progress Note      Patient Name: Ellen Rose YOB: 1995 SW: ALPHONSO BRAGG LPC Date: 1/31/2024    Chief Complaint: obsessive thinking and depression with passive SI     Consent:     Did the patient consent for an interview with the ? Yes    Did the patient consent for the  to contact family/friend/caregiver?   Yes  Name: Ray Rose, Relationship: , and Contact: [phone]    Did the patient give consent for the  to inform family/friend/caregiver of his/her whereabouts or to discuss discharge planning? Yes    Source of Information: Face to face with patient and Telephone interview with family/friend/caregiver    Is information obtained from interviews considered reliable?   yes    Reason for Admission:     Active Hospital Problems    Diagnosis  POA    *Depression [F32.A]  Yes    Pregnancy [Z34.90]  Not Applicable      Resolved Hospital Problems   No resolved problems to display.       History of Present Illness - (Patient Perception):   patient reports long standing issues with OCD and periodic depression. She was doing well until she got off of her medications secondly to pregnancy . Symptoms recurred over the last month or so and quickly progressed. She developed passive SI and uncontrolled obsessive thinking.       History of Present Illness - (Perception of Others):  Per Dr. Ray Sumner:  1/30/2024 8:45 AM   Ellen Rose   1995   [id number]                                        DATE OF ADMISSION: 1/29/2024  8:30 PM     SITE: [facility]     CURRENT LEGAL STATUS: PEC and/or CEC        HISTORY    CHIEF COMPLAINT   Ellen Rose is a 28 y.o. female with a past psychiatric history of OCD and depression currently admitted to the inpatient unit with the following chief complaint: obsessive thinking and depression with passive SI, "I needed to get back on " "medications."    HPI   The patient was seen and examined. The chart was reviewed.     The patient presented to the ER on 2024 . Per staff notes:  -Patient is 35 weeks pregnant, comes to ER today states she feels like she needs additional meds for her OCD and depression, states she does have a feeling of haplessness and would not mind never waking up again   -28-year-old female  at 35 weeks presenting with depression and worsening OCD tendencies.  Patient reports that she was taken off her psychiatric medications prior to her pregnancy, but in the last few weeks has been feeling more depressed.  She states that she feels resentment towards her unborn child, and has a feeling of not wanting to wake up in the morning.  Patient denies any abdominal pain, vaginal bleeding, vaginal discharge.  No other complaints.   -Patient reports to U for 7/10 depression and 5/10 anxiety. Reports that as a child she had OCD tendencies. However, as an adult she has been more depressed due to trying to control her OCD symptoms. She reports that she ruminates on conversations that happened years ago and she could not handle her symptoms. When she got pregnant, she had to get off of her medications (fluvoxamine, hydroxyzine, bupropion, and Ambien). These medications were substituted with fluoxetine. She has been to the ED twice in her community for psych placement, but states that nobody can accept her due to being pregnant. They placed her on hydroxyzine PRN and Unisom each evening, but they have not worked well for her. Upon getting closer to her delivery date, she is becoming anxious and "numb" to giving birth. Reports that she wishes she could go to sleep and not wake up, but does not have any plans to harm herself. States that she sometimes has resentment towards her baby because "now I can't do anything even if I wanted to". She has a history of being on a BHU for her OCD and depression. Denies HI. Denies hallucinations. " Reports decreased sleep and appetite. UDS negative. No alcohol use or smoking.      The patient was medically cleared and admitted to the [facility].     The patient reports long standing issues with OCD and periodic depression. She was doing well until she got off of her medications secondly to pregnancy (currently at about 35 weeks; first pregnancy) . Symptoms recurred over the last month or so and quickly progressed. She developed passive SI and uncontrolled obsessive thinking.      She plans to try breastfeeding.      Symptoms of Depression: diminished mood - Yes, loss of interest/anhedonia - Yes;  recurrent - Yes, >14 days - Yes, diminished energy - Yes, change in sleep - No, change in appetite - No, diminished concentration or cognition or indecisiveness - No, PMA/R -  No, excessive guilt or hopelessness or worthlessness - Yes, suicidal ideations - Yes     Changes in Sleep: trouble with initiation- No, maintenance, - No early morning awakening with inability to return to sleep - No, hypersomnolence - No     Suicidal- active/passive ideations - Yes/passive, no active, organized plans- no, future intentions - No     Homicidal ideations: active/passive ideations - No, organized plans- no, future intentions - No     Symptoms of psychosis: hallucinations - No, delusions - No, disorganized speech - No, disorganized behavior or abnormal motor behavior - No, or negative symptoms (diminshed emotional expression, avolition, anhedonia, alogia, asociality) - no, active phase symptoms >1 month - No, continuous signs of illness > 6 months - No, since onset of illness decreased level of functioning present - no     Symptoms of macario or hypomania: elevated, expansive, or irritable mood with increased energy or activity - No; > 4 days - No,  >7 days - No; with inflated self-esteem or grandiosity - No, decreased need for sleep - No, increased rate of speech - No, FOI or racing thoughts - Yes, distractibility - No, increased goal  "directed activity or PMA - No, risky/disinhibited behavior - No     Symptoms of ABRIL: excessive anxiety/worry/fear, more days than not, about numerous issues - No, ongoing for >6 months - No, difficult to control - No, with restlessness - No, fatigue - No, poor concentration - No, irritability - No, muscle tension - No, sleep disturbance - No; causes functionally impairing distress - No     Symptoms of Panic Disorder: recurrent panic attacks (palpitations/heart racing, sweating, shakiness, dyspnea, choking, chest pain/discomfort, Gi symptoms, dizzy/lightheadedness, hot/col flashes, paresthesias, derealization, fear of losing control or fear of dying or fear of "going crazy") - Yes, precipitated - Yes, un-precipitated - No, source of worry and/or behavioral changes secondary for 1 month or longer- Yes, agoraphobia - No     Symptoms of PTSD: h/o trauma exposure - No; re-experiencing/intrusive symptoms - No, avoidant behavior - No, 2 or more negative alterations in cognition or mood - No, 2 or more hyperarousal symptoms - No; with dissociative symptoms - No, ongoing for 1 or more  months - No     Symptoms of OCD: obsessions (recurrent thoughts/urges/images; intrusive and/or unwanted; uses other thoughts/actions to suppress) - Yes; compulsions (repetitive behaviors used to lower distress/anxiety/obsessions) - Yes, time-consuming (over 1 hour per day) or cause significant distress/impairment - Yes     Symptoms of Anorexia: restriction of caloric intake leading to significantly low body weight - No, intense fear of gaining weight or persistent behavior that interferes with weight gain even thought at a significantly low weight - No, disturbance in the way in which one's body weight or shape is experienced, undue influence of body weight or shape on self evaluation, or persistent lack of recognition of the seriousness of the current low body weight - No     Symptoms of Bulimia: recurrent episodes of binge eating (definitely " larger amount  than what others would eat and lack of a sense of control over eating during episode) - No, recurrent inappropriate compensatory behaviors in order to prevent weight gain (fasting, medications, exercise, vomiting) - No, binges and compensatory behaviors both occur on average at least once a week for 3 months - No, self evaluations is unduly influenced by body shape/weight- - No     Symptoms of Binge eating: recurrent episodes of binge eating (definitely larger amount than what others would eat and lack of a sense of control over eating during episode) - No, 3 or more of following (eating much more rapidly, eating until uncomfortably full, large amounts when not hungry, eating alone because of embarrassed by how much,  feeling disgusted with oneself, depressed or very guilty afterward) - No, distress regarding binges - No, binges occur on average at least once a week for 3 months - No        Substance/s:  Taken in larger amounts or over longer periods than intended: No,  Persistent desire or unsuccessful attempts to cut down or stop: No,  Great deal of time spent seeking, using or recovering from: No,  Craving or strong desire to use: No,  Recurrent use despite failure to meet major role obligation: No,  Continued use despite persistent or recurrent social/interparsonal issues due to use: No,  Important social/work/recreational activities given up due to use: No,  Recurrent use in physically hazardous situations: No,  Continued use despite knowledge of persistent physical or psychological problem: No,  Tolerance (either increased need or diminished effect): No,           Psychotherapy:  Target symptoms: depression, anxiety   Why chosen therapy is appropriate versus another modality: relevant to diagnosis, patient responds to this modality, evidence based practice  Outcome monitoring methods: self-report, observation  Therapeutic intervention type: insight oriented psychotherapy, behavior modifying  psychotherapy, supportive psychotherapy, interactive psychotherapy  Topics discussed/themes: building skills sets for symptom management, symptom recognition  The patient's response to the intervention is accepting. The patient's progress toward treatment goals is fair.   Duration of intervention: 40 minutes.        PAST PSYCHIATRIC HISTORY  Previous Psychiatric Hospitalizations: Yes  Previous SI/HI: Yes,  Previous Suicide Attempts: No,   Previous Medication Trials: No,  Psychiatric Care (current & past): Yes,  History of Psychotherapy: Yes,  History of Violence: No,  History of sexual/physical abuse: No,     PAST MEDICAL & SURGICAL HISTORY        Past Medical History:   Diagnosis Date    Major depressive disorder, single episode, unspecified      OCD (obsessive compulsive disorder)        No past surgical history on file.        CURRENT PSYCH MEDICATION REGIMEN   prozac  Current Medication side effects:  no  Current Medication compliance:  yes     Previous psych meds trials  Luvox, vistaril, ambien and others     Home Meds:           Prior to Admission medications    Medication Sig Start Date End Date Taking? Authorizing Provider   FLUoxetine 20 MG capsule Take 20 mg by mouth once daily.       Provider, Historical            OTC Meds: none     Scheduled Meds:    PRN Meds: acetaminophen, aluminum-magnesium hydroxide-simethicone, benzonatate, benztropine mesylate, hydrOXYzine pamoate, loperamide, nicotine, OLANZapine **AND** OLANZapine, ondansetron, promethazine   Psychotherapeutics (From admission, onward)        Start     Stop Route Frequency Ordered     01/29/24 2053   OLANZapine tablet 10 mg  (Olanzapine PRN (</= 66 yo))        See Hyperspace for full Linked Orders Report.    -- Oral Every 8 hours PRN 01/29/24 2053 01/29/24 2053   OLANZapine injection 10 mg  (Olanzapine PRN (</= 66 yo))        See Hyperspace for full Linked Orders Report.    -- IM Every 8 hours PRN 01/29/24 2053                ALLERGIES    Review of patient's allergies indicates:  No Known Allergies     NEUROLOGIC HISTORY  Seizures: No  Head trauma: No     SOCIAL HISTORY:  Developmental/Childhood:Delayed in achieving developmental milestone walked late  Education:High School Diploma  Employment Status/Finances:Employed   Relationship Status/Sexual Orientation:   Children: 0  Housing Status: Home    history:  NO   Access to Firearms: YES:stored at mothers home;  Locked up? yes  Mormon:Agnostic  Recreational activities:outdoors ,crafting     SUBSTANCE ABUSE HISTORY   Recreational Drugs: denied   Use of Alcohol: minimal use  Rehab History:no   Tobacco Use:no     LEGAL HISTORY:   Past charges/incarcerations: NO  Pending charges:NO     FAMILY PSYCHIATRIC HISTORY   History reviewed. No pertinent family history.     none        ROS  General ROS: negative  Ophthalmic ROS: negative  ENT ROS: negative  Allergy and Immunology ROS: negative  Hematological and Lymphatic ROS: negative  Endocrine ROS: negative  Respiratory ROS: no cough, shortness of breath, or wheezing  Cardiovascular ROS: no chest pain or dyspnea on exertion  Gastrointestinal ROS: no abdominal pain, change in bowel habits, or black or bloody stools  Genito-Urinary ROS: no dysuria, trouble voiding, or hematuria  Musculoskeletal ROS: negative  Neurological ROS: no TIA or stroke symptoms  Dermatological ROS: negative           EXAMINATION     PHYSICAL EXAM  Reviewed note/exam by Dr. Jeffries from 1/29/24 at 4:27 PM; FM consulted for physical exam- pending      VITALS       Vitals:     01/30/24 0800   BP: (!) 101/59   Pulse: 99   Resp: 19   Temp: 97.4 °F (36.3 °C)         Body mass index is 34.27 kg/m².           PAIN  0/10  Subjective report of pain matches objective signs and symptoms: Yes     LABORATORY DATA   Recent Results         Recent Results (from the past 72 hour(s))   Urinalysis, Reflex to Urine Culture Urine, Clean Catch     Collection Time: 01/29/24  4:41 PM     Specimen:  Urine   Result Value Ref Range     Specimen UA Urine, Clean Catch       Color, UA Luna Yellow, Straw, Luna     Appearance, UA Hazy (A) Clear     pH, UA 7.0 5.0 - 8.0     Specific Gravity, UA >=1.030 (A) 1.005 - 1.030     Protein, UA 1+ (A) Negative     Glucose, UA Trace (A) Negative     Ketones, UA 3+ (A) Negative     Bilirubin (UA) 1+ (A) Negative     Occult Blood UA Trace (A) Negative     Nitrite, UA Negative Negative     Urobilinogen, UA 1.0 <2.0 EU/dL     Leukocytes, UA Trace (A) Negative   Drug screen panel, emergency     Collection Time: 01/29/24  4:41 PM   Result Value Ref Range     Benzodiazepines Negative Negative     Methadone metabolites Negative Negative     Cocaine (Metab.) Negative Negative     Opiate Scrn, Ur Negative Negative     Barbiturate Screen, Ur Negative Negative     Amphetamine Screen, Ur Negative Negative     THC Negative Negative     Phencyclidine Negative Negative     Creatinine, Urine 260.7 15.0 - 325.0 mg/dL     Toxicology Information SEE COMMENT     Pregnancy, urine rapid     Collection Time: 01/29/24  4:41 PM   Result Value Ref Range     Preg Test, Ur Positive (A)     Urinalysis Microscopic     Collection Time: 01/29/24  4:41 PM   Result Value Ref Range     RBC, UA 2 0 - 4 /hpf     WBC, UA 11 (H) 0 - 5 /hpf     Bacteria Moderate (A) None-Occ /hpf     Squam Epithel, UA 50 /hpf     Hyaline Casts, UA 0 0-1/lpf /lpf     Microscopic Comment SEE COMMENT     CBC auto differential     Collection Time: 01/29/24  5:03 PM   Result Value Ref Range     WBC 7.56 3.90 - 12.70 K/uL     RBC 3.92 (L) 4.00 - 5.40 M/uL     Hemoglobin 11.6 (L) 12.0 - 16.0 g/dL     Hematocrit 34.3 (L) 37.0 - 48.5 %     MCV 88 82 - 98 fL     MCH 29.6 27.0 - 31.0 pg     MCHC 33.8 32.0 - 36.0 g/dL     RDW 12.8 11.5 - 14.5 %     Platelets 344 150 - 450 K/uL     MPV 10.1 9.2 - 12.9 fL     Immature Granulocytes 0.4 0.0 - 0.5 %     Gran # (ANC) 5.6 1.8 - 7.7 K/uL     Immature Grans (Abs) 0.03 0.00 - 0.04 K/uL     Lymph # 1.3 1.0  "- 4.8 K/uL     Mono # 0.6 0.3 - 1.0 K/uL     Eos # 0.0 0.0 - 0.5 K/uL     Baso # 0.02 0.00 - 0.20 K/uL     nRBC 0 0 /100 WBC     Gran % 73.8 (H) 38.0 - 73.0 %     Lymph % 17.6 (L) 18.0 - 48.0 %     Mono % 7.8 4.0 - 15.0 %     Eosinophil % 0.1 0.0 - 8.0 %     Basophil % 0.3 0.0 - 1.9 %     Differential Method Automated     Comprehensive metabolic panel     Collection Time: 01/29/24  5:03 PM   Result Value Ref Range     Sodium 138 136 - 145 mmol/L     Potassium 3.9 3.5 - 5.1 mmol/L     Chloride 107 95 - 110 mmol/L     CO2 19 (L) 23 - 29 mmol/L     Glucose 116 (H) 70 - 110 mg/dL     BUN 9 6 - 20 mg/dL     Creatinine 0.7 0.5 - 1.4 mg/dL     Calcium 9.0 8.7 - 10.5 mg/dL     Total Protein 6.3 6.0 - 8.4 g/dL     Albumin 2.6 (L) 3.5 - 5.2 g/dL     Total Bilirubin 0.6 0.1 - 1.0 mg/dL     Alkaline Phosphatase 98 55 - 135 U/L     AST 50 (H) 10 - 40 U/L     ALT 90 (H) 10 - 44 U/L     eGFR >60 >60 mL/min/1.73 m^2     Anion Gap 12 8 - 16 mmol/L   TSH     Collection Time: 01/29/24  5:03 PM   Result Value Ref Range     TSH 1.210 0.400 - 4.000 uIU/mL   Ethanol     Collection Time: 01/29/24  5:03 PM   Result Value Ref Range     Alcohol, Serum <10 <10 mg/dL   Acetaminophen level     Collection Time: 01/29/24  5:03 PM   Result Value Ref Range     Acetaminophen (Tylenol), Serum <3.0 (L) 10.0 - 20.0 ug/mL   Lipid Panel     Collection Time: 01/29/24  5:03 PM   Result Value Ref Range     Cholesterol 270 (H) 120 - 199 mg/dL     Triglycerides 199 (H) 30 - 150 mg/dL     HDL 58 40 - 75 mg/dL     LDL Cholesterol 172.2 (H) 63.0 - 159.0 mg/dL     HDL/Cholesterol Ratio 21.5 20.0 - 50.0 %     Total Cholesterol/HDL Ratio 4.7 2.0 - 5.0     Non-HDL Cholesterol 212 mg/dL         No results found for: "PHENYTOIN", "PHENOBARB", "VALPROATE", "CBMZ"           CONSTITUTIONAL  General Appearance: unremarkable, age appropriate, normal weight, well nourished, casually dressed     MUSCULOSKELETAL  Muscle Strength and Tone:no tremor, no tic  Abnormal Involuntary " Movements: No  Gait and Station: non-ataxic     PSYCHIATRIC   Level of Consciousness: awake and alert   Orientation: person, place, time, and situation  Grooming: Casually dressed and Well groomed  Psychomotor Behavior: normal, cooperative, friendly and cooperative, eye contact normal  Speech: normal tone, normal rate, normal pitch, normal volume  Language: grossly intact  Mood: neutral and sad  Affect: Consistent with mood  Thought Process: linear, logical  Associations: intact   Thought Content: denies SI and denies HI  Perceptions: denies AH and denies  VH  Memory: Able to recall past events, Remote intact, and Recent intact  Attention:Attends to interview without distraction  Fund of Knowledge: Aware of current events and Vocabulary appropriate   Estimate if Intelligence:  Average based on work/education history, vocabulary and mental status exam  Insight: has awareness of illness  Judgment: behavior is adequate to circumstances        PSYCHOSOCIAL     PSYCHOSOCIAL STRESSORS   family, financial, marital, and occupational     FUNCTIONING RELATIONSHIPS   good support system, strained with spouse or significant others, and good relationship with spouse or significant other     STRENGTHS AND LIABILITIES   Strength: Patient accepts guidance/feedback, Strength: Patient is expressive/articulate., Strength: Patient has positive support network., Liability: Patient is unstable., Liability: Patient lacks coping skills.     Is the patient aware of the biomedical complications associated with substance abuse and mental illness? yes     Does the patient have an Advance Directive for Mental Health treatment? no  (If yes, inform patient to bring copy.)           MEDICAL DECISION MAKING          ASSESSMENT      MDD, recurrent, severe without psychotic features   OCD  Unspecified anxiety disorder (panic attacks)     Psychosocial stressors      IUP- approximately 35 weeks     Elevated liver enzymes  ELevated blood glucose          PROBLEM LIST AND MANAGEMENT PLANS     Depression: pt counseled  -start trial of Zoloft at 25 mg po q day    OCD: pt counseled  -zoloft as above     Anxiety: pt counseled  -zoloft as above  -vistaril prn     Psychosocial stressors: pt counseled  -SW consulted to assist with resources       IUP- approximately 35 weeks: pt counseled  -checking fetal heart tones  -will consult Ob as/if needed     Elevated liver enzymes: pt counseled     ELevated blood glucose: pt counseled  -check HgA1c       PRESCRIPTION DRUG MANAGEMENT  Compliance: yes  Side Effects: no  Regimen Adjustments: see above     Discussed diagnosis, risks and benefits of proposed treatment vs alternative treatments vs no treatment, potential side effects of these treatments and the inherent unpredictability of treatment. The patient expresses understanding of the above and displays the capacity to agree with this treatment given said understanding. Patient also agrees that, currently, the benefits outweigh the risks and would like to pursue/continue treatment at this time.     Any medications being used off-label were discussed with the patient inclusive of the evidence base for the use of the medications and consent was obtained for the off-label use of the medication.      DIAGNOSTIC TESTING  Labs reviewed with patient; follow up pending labs     Disposition:  -Will attempt to obtain outside psychiatric records if available  -SW to assist with aftercare planning and collateral  -Once stable discharge home with outpatient follow up care and/or rehab  -Continue inpatient treatment under a PEC and/or CEC for danger to self/ danger to others/grave disability as evident by danger to self, gravely disabled, and suicidal ideation          Present biopsychosocial functioning: Pt is a 28 year old  female with chief complaints of obsessive thinking and depression with passive SI.Patient  is , pregnant with her first child, is a high school  graduate, is employed but on a break, lives in [place], [place] with her . Reports that as a child she had OCD tendencies. However, as an adult she has been more depressed due to trying to control her OCD symptoms. She reports that she ruminates on conversations that happened years ago and she could not handle her symptoms. When she got pregnant, she had to get off of her medications (fluvoxamine, hydroxyzine, bupropion, and Ambien).     Past biopsychosocial functioning:   Pt has a  past psychiatric history of OCD and depression. Pt has previous psychiatric hospitalizations in the past and has had previous SI.  patient reports long standing issues with OCD and periodic depression     Family and Marital/Relationship History:     Significant Other/Partner Relationships:  : Relationship intact    Family Relationships: Intact      Childhood History:     Where was patient raised? [place]    Who raised the patient?  Biological parents, but they were  when she was young and lived with her father      How does patient describe their childhood?   good      Who is patient's primary support person?  Ray Rose/      Culture and Adventist:     Adventist: Shinto    How strong of a role does Oriental orthodox and spirituality play in patient's life?   Spiritual without formal affiliations    Islam or spiritual concerns regarding treatment: not applicable     History of Abuse:   History of Abuse: Denies      Outcome: n/a    Psychiatric and Medical History:     History of psychiatric illness or treatment: prior inpatient treatment and has participated in counseling/psychotherapy on an outpatient basis in the past    Medical history:   Past Medical History:   Diagnosis Date    Major depressive disorder, single episode, unspecified     OCD (obsessive compulsive disorder)        Substance Abuse History:     Alcohol - (Patient Perspective):   Social History     Substance and Sexual Activity   Alcohol Use Not  Currently       Alcohol - (Collateral Perspective):    Per chart review pt does not endorse in alcohol.    Drugs - (Patient Perspective):   Social History     Substance and Sexual Activity   Drug Use Not Currently       Drugs - (Collateral Perspective):    Per chart review pt does not endorse in drug use.    Additional Comments:  Pt UDS was negative upon admit.    Education:     Currently Enrolled? No  High School (9-12) or GED    Special Education? No    Interested in Completing Education/GED: No    Employment and Financial:     Currently employed? 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 Yes    Who manages finances/personal affairs?  Pt states self and       Service:     [place]? no    Combat Service? No     Community Resources:     Describe present use of community resources:   [facility]     Identify previously used community resources   (Include previous mental health treatment - outpatient and inpatient):  [facility]    Environmental:     Current living situation:Lives with spouse    Social Evaluation:     Patient Assets: Average or above intelligence, Work skills, Communicable skills, and Financial means    Patient Limitations: pregnancy    High risk psychosocial issues that may impact discharge planning:   OCD and depression       Recommendations:     Anticipated discharge plan:   outpatient follow up: [facility]    High risk issues requiring early treatment planning and immediate intervention:   OCD and depression     Community resources needed for discharge planning:  aftercare treatment sources    Anticipated social work role(s) in treatment and discharge planning:   [facility] will engage pt in treatment and encourage participation in group therapy. Safety plan to be facilitated and encouraged. [facility] will aid in discharge  planning.

## 2024-01-31 NOTE — PROGRESS NOTES
"PSYCHIATRY DAILY INPATIENT PROGRESS NOTE  SUBSEQUENT HOSPITAL VISIT    ENCOUNTER DATE: 1/31/2024  [facility]    DATE OF ADMISSION: 1/29/2024  8:30 PM  LENGTH OF STAY: 2 days      CHIEF COMPLAINT   Ellen Rose is a 28 y.o. female, seen during daily rivera rounds on the inpatient unit.  Ellen Rose presented with the chief complaint of  obsessive thinking and depression with passive SI, "I needed to get back on medications."       The patient was seen and examined. The chart was reviewed.     Reviewed notes from Rns, PA, and CTRS and labs from the last 24 hours.    The patient's case was discussed with the treatment team/care providers today including Rns and LPC    Staff reports no behavioral or management issues.     The patient has been compliant with treatment.      Subjective 01/31/2024       Today the patient reports that she is doing "ok today." Overall, symptoms persists minimally changed form yesterday.  -Obsessive thinking persists; no compulsions noted  -Anxiety persists  -Depression persist with less intense/frequent passive SI.    She discussed her stressors.    She continues to note +fetal movements.      We discussed the r/b of tx vs alternative tx vs no treatment for her and her child; she feels that that the risks of no tx or greater than tx; she wants to continue pharmacotherapy as documented below.       The patient denies any side effects to medications.            Psychiatric ROS (observed, reported, or endorsed/denied):  Depressed mood - Continuing  Interest/pleasure/anhedonia: Continuing  Guilt/hopelessness/worthlessness - Continuing  Changes in Sleep - denies  Changes in Appetite - variable  Changes in Concentration - variable  Changes in Energy - variable  PMA/R- No  Suicidal- active/passive ideations - decreasing slowly  Homicidal ideations: active/passive ideations - No    Hallucinations - No  Delusions - No  Disorganized behavior - No  Disorganized speech - No  Negative " symptoms - No    Elevated mood - No  Decreased need for sleep - No  Grandiosity - No  Racing thoughts - No  Impulsivity - No  Irritability- No  Increased energy - No  Distractibility - No  Increase in goal-directed activity or PMA- No    Symptoms of ABRIL - denies  Symptoms of Panic Disorder- stable  Symptoms of PTSD - denies        Overall progress: Patient is showing mild improvement         Psychotherapy:  Target symptoms: depression, anxiety   Why chosen therapy is appropriate versus another modality: relevant to diagnosis, patient responds to this modality, evidence based practice  Outcome monitoring methods: self-report, observation  Therapeutic intervention type: insight oriented psychotherapy, behavior modifying psychotherapy, supportive psychotherapy, interactive psychotherapy  Topics discussed/themes: building skills sets for symptom management, symptom recognition  The patient's response to the intervention is accepting. The patient's progress toward treatment goals is fair.   Duration of intervention: 16 minutes.        Medical ROS  General ROS: negative  Ophthalmic ROS: negative  ENT ROS: negative  Allergy and Immunology ROS: negative  Hematological and Lymphatic ROS: negative  Endocrine ROS: negative  Respiratory ROS: no cough, shortness of breath, or wheezing  Cardiovascular ROS: no chest pain or dyspnea on exertion  Gastrointestinal ROS: no abdominal pain, change in bowel habits, or black or bloody stools  Genito-Urinary ROS: no dysuria, trouble voiding, or hematuria  Musculoskeletal ROS: negative  Neurological ROS: no TIA or stroke symptoms  Dermatological ROS: negative      PAST MEDICAL HISTORY   Past Medical History:   Diagnosis Date    Major depressive disorder, single episode, unspecified     OCD (obsessive compulsive disorder)            PSYCHOTROPIC MEDICATIONS   Scheduled Meds:   prenatal vitamin  1 tablet Oral Daily    sertraline  25 mg Oral Daily     Continuous Infusions:  PRN  Meds:.acetaminophen, aluminum-magnesium hydroxide-simethicone, benzonatate, benztropine mesylate, hydrOXYzine pamoate, loperamide, nicotine, OLANZapine **AND** OLANZapine, ondansetron, promethazine        EXAMINATION    VITALS   Vitals:    01/30/24 0800 01/30/24 2100 01/31/24 0732 01/31/24 0800   BP: (!) 101/59 114/68 (!) 110/55    BP Location:  Left arm Right arm    Patient Position: Sitting Sitting Lying    Pulse: 99 91 86    Resp: 19 18 18    Temp: 97.4 °F (36.3 °C) 97.6 °F (36.4 °C) 97.4 °F (36.3 °C)    TempSrc: Temporal Temporal Temporal    SpO2:       Weight:    80.4 kg (177 lb 4 oz)   Height:           Body mass index is 34.62 kg/m².      CONSTITUTIONAL  General Appearance: unremarkable, age appropriate, normal weight, well nourished, casually dressed     MUSCULOSKELETAL  Muscle Strength and Tone:no tremor, no tic  Abnormal Involuntary Movements: No  Gait and Station: non-ataxic     PSYCHIATRIC   Level of Consciousness: awake and alert   Orientation: person, place, time, and situation  Grooming: Casually dressed and Well groomed  Psychomotor Behavior: normal, cooperative, friendly and cooperative, eye contact normal  Speech: normal tone, normal rate, normal pitch, normal volume  Language: grossly intact  Mood: neutral and sad  Affect: Consistent with mood  Thought Process: linear, logical  Associations: intact   Thought Content: denies SI and denies HI  Perceptions: denies AH and denies  VH  Memory: Able to recall past events, Remote intact, and Recent intact  Attention:Attends to interview without distraction  Fund of Knowledge: Aware of current events and Vocabulary appropriate   Estimate if Intelligence:  Average based on work/education history, vocabulary and mental status exam  Insight: has awareness of illness  Judgment: behavior is adequate to circumstances        DIAGNOSTIC TESTING   Laboratory Results  No results found for this or any previous visit (from the past 24 hour(s)).          MEDICAL  DECISION MAKING      ASSESSMENT:   MDD, recurrent, severe without psychotic features   OCD  Unspecified anxiety disorder (panic attacks)     Psychosocial stressors      IUP- approximately 35 weeks     Elevated liver enzymes  ELevated blood glucose         PROBLEM LIST AND MANAGEMENT PLANS     Depression: pt counseled  -start trial of Zoloft at 25 mg po q day- increase to 50 mg po q day tomorrow     OCD: pt counseled  -zoloft as above     Anxiety: pt counseled  -zoloft as above  -vistaril prn     Psychosocial stressors: pt counseled  -SW consulted to assist with resources       IUP- approximately 35 weeks: pt counseled  -checking fetal heart tones- WNL  -will consult Ob as/if needed     Elevated liver enzymes: pt counseled     ELevated blood glucose: pt counseled  -check HgA1c- 4.9           Discussed diagnosis, risks and benefits of proposed treatment vs alternative treatments vs no treatment, potential side effects of these treatments and the inherent unpredictability of treatment. The patient expresses understanding of the above and displays the capacity to agree with this treatment given said understanding. Patient also agrees that, currently, the benefits outweigh the risks and would like to pursue/continue treatment at this time.    Any medications being used off-label were discussed with the patient inclusive of the evidence base for the use of the medications and consent was obtained for the off-label use of the medication.       DISCHARGE PLANNING  Expected Disposition Plan: Home or Self Care      NEED FOR CONTINUED HOSPITALIZATION  Psychiatric illness continues to pose a potential threat to life or bodily function, of self or others, thereby requiring the need for continued inpatient psychiatric hospitalization: Yes, due to: danger to self, gravely disabled, and suicidal ideation, as evidenced by:  Ongoing concerns with SI. and Ongoing concerns with grave disability with patient unable to perform basic  feeding, hygiene and dressing activities without significant constant support.    Protective inpatient pyschiatric hospitalization required while a safe disposition plan is enacted: Yes    Patient stabilized and ready for discharge from inpatient psychiatric unit: No        STAFF:   Ray Sumner MD  Psychiatry

## 2024-01-31 NOTE — PROGRESS NOTES
"   01/31/24 0837   Assessment   Patient's Identification of the Problem Patient presents depressed, anxious, reports a tired" mood, "I didn't sleep very well last night." Patient states her admit is due to "depression, anxiety suicidal ideations and to try to get help for my obsessive compulsive disorder... I was having competitive thoughts and fixation on things." Patient reports she was taken off her medication since being pregnant and was recently put back on Prozac. Patient denies alcohol or drug use. Patient reports she is , pregnant with her first child, is a high school graduate, is employed but on a break, lives in [place], [place] with her . Patient verbalized her main goals, "to get a better handle on OCD thoughts. I think depression and anxiety will ease when the OCD is under control."   Leisure Interest Sit Outside;Arts and Crafts;Enjoy Nature;Shopping;Watching TV;Enjoy Family/Friends;Cooking;Yard Work  ([employer] puzzles, [employer] and garage sales)   Leisure Barriers Loss of Interest;Lack of Finances;Energy Level;Fears/Phobias;Other (See Comments)  (being fixated on junk piles and trash cans, spend so much time ruminating on something then don't have leisure time, fear mice and fear getting rid of stuff)   Treatment Focus To Improve Mood;To Improve Leisure Awareness/Lifestyle/Interest;To Promote Successful and Safe Self Expression;To Improve Coping Skills;To Increase Energy Level       Treatment Recommendation:   1:1 Intervention (as needed)    Cognitive Stimulation Skilled Activity  Creative Expression Skilled Activity  Mild Exercises Skilled Activity  Stress Management Skilled Activity  Coping Skilled Activity  Leisure Education and Awareness Skilled Activity    Treatment Goal(s):  Long Term Goals Refer To Master Treatment Plan    Short Term Treatment Goal(s)  Patient Will:  Comply with Treatment  Exhibit Improvement in Mood  Demonstrate Constructive Expression of Feelings and " Behavior  Verbalize Improvement in Mood  Identify at Least 2 Coping Skills or Leisure Skills to Reduce Depression and Hopelessness Upon Request from Therapist    Discharge Recommendations:  Encourage Patient to Actively Utilize Available Community Resources to Increase Leisure Involvement to Decrease Signs and Symptoms of Illness  Encourage Patient to Utilize Coping Skills on a Regular Basis to Reduce the Risk of Decompensating and Re-Hospitalizations  Follow Up with After Care Appointments  Continue with Current Leisure Activities

## 2024-01-31 NOTE — CONSULTS
[facility]  Adult Nutrition  Consult Note    SUMMARY     Recommendations  1. Continue regular diet with well spaced snacks.   2. Continue prenatal vitamins.  2. RD to follow.    Intervention  1. Collaboration with other providers    Goals: Pt will continue to consume at least 75% ENN by RD follow up.  Nutrition Goal Status: new  Communication of RD Recs: other (comment) (POC)    Assessment and Plan    No nutrition diagnosis at this time. Please re-consult if any acute nutrition concerns arise prior to RD follow up on 24.           Malnutrition Assessment       No NFPE -- PEC                                Reason for Assessment    Reason For Assessment: consult  Diagnosis: psychological disorder  Relevant Medical History: MDD, OCD  Interdisciplinary Rounds: did not attend  General Information Comments:   Consult received for 29 yo F who is a new admit to [facility]. Presented to ED with obsessive thinking and depression with SI. Pt is 35 weeks pregnant. Consuming % of meals with snacks at this time. Meeting ENN. LBM unknown. No weight hx per chart review. Will continue to monitor for any nutrition related changes.  Nutrition Discharge Planning: Pt to dc on general healthful diet    Nutrition Risk Screen    Nutrition Risk Screen: no indicators present    Nutrition/Diet History    Food Allergies: NKFA  Factors Affecting Nutritional Intake: None identified at this time    Anthropometrics    Temp: 97.4 °F (36.3 °C)  Height: 5' (152.4 cm)  Height (inches): 60 in  Weight Method: Standard Scale  Weight: 80.4 kg (177 lb 4 oz)  Weight (lb): 177.25 lb  Ideal Body Weight (IBW), Female: 100 lb  % Ideal Body Weight, Female (lb): 175.49 %  BMI (Calculated): 34.6  BMI Grade: 30 - 34.9- obesity - grade I       Lab/Procedures/Meds    Pertinent Labs Reviewed: reviewed  Pertinent Labs Comments: glucose: 116 (H), AST/ALT: 50/90 (H), cholesterol: 270 (H), T (H)  Lab Results   Component Value Date    HGBA1C  4.9 01/29/2024     Pertinent Medications Reviewed: reviewed  Pertinent Medications Comments: prenatal vitamin, Zoloft    PRN: acetaminophen, aluminum-magnesium hydroxide-simethicone, benzonatate, benztropine mesylate, hydrOXYzine pamoate, loperamide, nicotine, OLANZapine **AND** OLANZapine, ondansetron, promethazine    Physical Findings/Assessment         Estimated/Assessed Needs    Weight Used For Calorie Calculations: 80.4 kg (177 lb 4 oz)  Energy Calorie Requirements (kcal): 2351  Energy Need Method: [place]-St Jeor (MSJ x 1.3 AF + 452 kcals for 3rd trimester)  Protein Requirements: 88 g (1.1 g/kg for pregnancy)  Weight Used For Protein Calculations: 80.4 kg (177 lb 4 oz)  Fluid Requirements (mL): 1 mL/kcal  Estimated Fluid Requirement Method: RDA Method (or per MD)  RDA Method (mL): 2351         Nutrition Prescription Ordered    Current Diet Order: regular    Evaluation of Received Nutrient/Fluid Intake    I/O: N/A  Energy Calories Required: meeting needs  Protein Required: meeting needs  Fluid Required: meeting needs  Tolerance: tolerating  % Intake of Estimated Energy Needs: 75 - 100 %  % Meal Intake: 75 - 100 %    Nutrition Risk    Level of Risk/Frequency of Follow-up: low (1 x per week)       Monitor and Evaluation    Food and Nutrient Intake: energy intake, food and beverage intake  Food and Nutrient Adminstration: diet order  Knowledge/Beliefs/Attitudes: beliefs and attitudes, food and nutrition knowledge/skill  Physical Activity and Function: nutrition-related ADLs and IADLs, factors affecting access to physical activity  Anthropometric Measurements: height/length, weight, weight change, body mass index  Biochemical Data, Medical Tests and Procedures: electrolyte and renal panel, gastrointestinal profile, glucose/endocrine profile, inflammatory profile, lipid profile       Nutrition Follow-Up    RD Follow-up?: Yes        Jocelin Verma, CHAUNCEYN, TAYLAN

## 2024-01-31 NOTE — PLAN OF CARE
Problem: Adult Behavioral Health Plan of Care  Goal: Plan of Care Review  Outcome: Ongoing, Progressing  Goal: Patient-Specific Goal (Individualization)  Outcome: Ongoing, Progressing  Goal: Adheres to Safety Considerations for Self and Others  Outcome: Ongoing, Progressing  Goal: Absence of New-Onset Illness or Injury  Outcome: Ongoing, Progressing  Goal: Optimized Coping Skills in Response to Life Stressors  Outcome: Ongoing, Progressing     Problem: Activity and Energy Impairment (Depressive Signs/Symptoms)  Goal: Optimized Energy Level (Depressive Signs/Symptoms)  Outcome: Ongoing, Progressing     Problem: Cognitive Impairment (Depressive Signs/Symptoms)  Goal: Optimized Cognitive Function  Outcome: Ongoing, Progressing     Problem: Decreased Participation/Engagement (Depressive Signs/Symptoms)  Goal: Increased Participation and Engagement (Depressive Signs/Symptoms)  Outcome: Ongoing, Progressing     Problem: Feelings of Worthlessness, Hopelessness or Excessive Guilt (Depressive Signs/Symptoms)  Goal: Enhanced Self-Esteem and Confidence (Depressive Signs/Symptoms)  Outcome: Ongoing, Progressing     Problem: Mood Impairment (Depressive Signs/Symptoms)  Goal: Improved Mood Symptoms (Depressive Signs/Symptoms)  Outcome: Ongoing, Progressing     Problem: Nutrition Imbalance (Depressive Signs/Symptoms)  Goal: Optimized Nutrition Intake  Outcome: Ongoing, Progressing     Problem: Psychomotor Impairment (Depressive Signs/Symptoms)  Goal: Improved Psychomotor Symptoms (Depressive Signs/Symptoms)  Outcome: Ongoing, Progressing     Problem: Sleep Disturbance (Depressive Signs/Symptoms)  Goal: Improved Sleep (Depressive Signs/Symptoms)  Outcome: Ongoing, Progressing     Problem: Social, Occupational or Functional Impairment (Depressive Signs/Symptoms)  Goal: Enhanced Social, Occupational or Functional Skills (Depressive Signs/Symptoms)  Outcome: Ongoing, Progressing     Problem: Maternal-Fetal Wellbeing  Goal: Optimal  Maternal-Fetal Wellbeing  Outcome: Ongoing, Progressing     Problem:  Fall Injury Risk  Goal: Absence of Fall, Infant Drop and Related Injury  Outcome: Ongoing, Progressing     Problem: Suicide Risk  Goal: Absence of Self-Harm  Outcome: Ongoing, Progressing

## 2024-01-31 NOTE — NURSING
Pt sleeping at this time, slept 6 hrs with 3 awakenings. NAD. Resp even and unlabored.Pathways clear,bed in low position. Q 15 min safety check ongoing.All precautions maintained.

## 2024-01-31 NOTE — PLAN OF CARE
Problem: Adult Behavioral Health Plan of Care  Goal: Rounds/Family Conference  Outcome: Ongoing, Progressing  Flowsheets (Taken 1/31/2024 4505)  Participants:   psychiatrist   nursing   other ([facility])  TREATMENT TEAM      Chief Complaint:    Pt is a 28 year old  female with chief complaints of obsessive thinking and depression with passive SI.    Pt states she was struggling on obsessive thoughts and she moves slowly with tasks.     Pt Goal(s):    Pt states to get her medications corrected and working well.    Current Progress:   Pt attended treatment team dressed in personal clothing    Pt affect consistent with mood/ sad mood   Pt discussed about her stressors from her childhood and adulthood.  Pt states as she got older her OCD   was worse. Pt states she grew up with her father being a hoarder and it is harder for her to let  items go and holds on to them thinking they might be important one day. Pt states her OCD is getting to the point to where she thinks about her  throwing things away in the trash can and she thinks later about what he threw away thinking that it was something important.    Pt states a couple of weeks ago her OCD was worse due to a flood of thoughts came into her thoughts about giving birth to her child.    Pt states she is not sleeping well and felt a huge surge of anxiety  after she took medications last night.     Pt OCD persists with no compulsions noted.   Pt depression persists with less intense/frequent passive SI.    Program/groups:    Encourage pt to continue to attend all groups. Pt new admit      Revisions to Plan:  Encourage pt to attend treatment team and to attend all groups. Recommendations are for IOP, psychotherapy and medication management.   MEDICATIONS:  Zoloft at 25 mg po q day- increase to 50 mg po q day tomorrow

## 2024-01-31 NOTE — NURSING
POC reviewed this shift and is ongoing.  Pt calm and cooperative on unit, no medications due this shift.  Pt requested and was given hydroxyzine for sleep.  Pt denies SI/HI/AVH.  Pt reports improved mood and a sense of relief since being admitted.

## 2024-01-31 NOTE — PLAN OF CARE
Recommendations  1. Continue regular diet with well spaced snacks.   2. Continue prenatal vitamins.  2. RD to follow.     Intervention  1. Collaboration with other providers     Goals: Pt will continue to consume at least 75% ENN by RD follow up.  Nutrition Goal Status: new

## 2024-01-31 NOTE — PROGRESS NOTES
"   01/31/24 7061   [facility] Group Therapy   Group Name Other  (1:1)   Specific Interventions Coping Skills Training   Participation Level Sharing   Participation Quality Cooperative   Insight/Motivation Good   Affect/Mood Display Anxious;Depressed   Cognition Alert   Psychomotor WNL     Patient presents anxious, depressed, reports a "tired, little depressed, little anxious" mood, "I didn't sleep very well at all. I'm still kind of down in the dumps, but happy to be here because there is less for me to worry about and do, less decisions." Patient verbalized that doing the jigsaw puzzles here stimulates her interest and helps her to focus because of the different colors and patterns and helps her to be less anxious.  "

## 2024-01-31 NOTE — PLAN OF CARE
Pt is calm and cooperative.  Denies any S/I or H/I at this time.  Pt reports feeling about the same.  Obsessive thoughts remain.  Pt instructed on how to cope with thoughts.  Given handouts on OCD behaviors by counselor.  Pt reports not sleeping well last night and having high anxiety after taking vistaril for insomnia.  Med order adjusted.  She has no OB complaints this morning.  She is interactive on the unit.  Behavior is appropriate.  She is tired, resting in bed at this time d/t lack of sleep last night.  Will allow pt to rest and continue to monitor.

## 2024-01-31 NOTE — PROGRESS NOTES
01/31/24 1445   [facility] Group Therapy   Group Name Therapeutic Recreation   Specific Interventions Cognitive Stimulation Training   Participation Level Appropriate   Participation Quality Cooperative;Social   Insight/Motivation Applies New Skills;Good   Affect/Mood Display Depressed   Cognition Alert   Psychomotor WNL     Patient presents willing to participate, reports that she was still feeling kind of down, felt better after   getting some sleep today, started working on another jigsaw puzzle to help focus and decrease anxiety.

## 2024-02-01 PROCEDURE — 90836 PSYTX W PT W E/M 45 MIN: CPT | Mod: ,,, | Performed by: PSYCHIATRY & NEUROLOGY

## 2024-02-01 PROCEDURE — 25000003 PHARM REV CODE 250: Performed by: PSYCHIATRY & NEUROLOGY

## 2024-02-01 PROCEDURE — 99233 SBSQ HOSP IP/OBS HIGH 50: CPT | Mod: ,,, | Performed by: PSYCHIATRY & NEUROLOGY

## 2024-02-01 PROCEDURE — 11400000 HC PSYCH PRIVATE ROOM

## 2024-02-01 RX ADMIN — HYDROXYZINE PAMOATE 50 MG: 50 CAPSULE ORAL at 12:02

## 2024-02-01 RX ADMIN — SERTRALINE HYDROCHLORIDE 50 MG: 50 TABLET ORAL at 08:02

## 2024-02-01 RX ADMIN — HYDROXYZINE PAMOATE 100 MG: 50 CAPSULE ORAL at 08:02

## 2024-02-01 RX ADMIN — PRENATAL VIT W/ FE FUMARATE-FA TAB 27-0.8 MG 1 TABLET: 27-0.8 TAB at 08:02

## 2024-02-01 NOTE — PLAN OF CARE
"Pt is calm and cooperative.  She reports still not sleeping well last night despite the extra dose of vistaril.  She denies any S/I or H/I at this time.  Reports feeling somewhat betters, states "I'm not  or anything but I feel ok".  Denies any abdominal discomfort.  Participating in rec group at this time.  No complaints voiced and no acute distress apparent at this time, will continue to monitor.  "

## 2024-02-01 NOTE — PLAN OF CARE
Problem: Decreased Participation/Engagement (Depressive Signs/Symptoms)  Goal: Increased Participation and Engagement (Depressive Signs/Symptoms)  Outcome: Ongoing, Progressing  Intervention: Facilitate Participation and Engagement  Flowsheets (Taken 2/1/2024 1425)  Supportive Measures:   active listening utilized   counseling provided   decision-making supported   verbalization of feelings encouraged   self-reflection promoted   Process Group        Behavior:  Pt attended group dressed in personal clothing. Pt attentive and engaged.      Intervention:     Process discussion on symptoms of indication of processing thoughts with worrying. Patients were encouraged to identify ways to cope with and reflect on worrying about problems and what coping skills did they learn while in the hospital?        Response:  Pt affect flat with dysphoric mood. Pt states she knows that she has to let the obsessive thoughts go and it is very hard when her thoughts are racing and she has no control over them. She will st a goal with her  to support her in second guessing her decisions.     Plan:  Staff will continue to assess and obtain collaterals as needed.  Staff will coordinate discharge to home with residential treatment when deemed stable.

## 2024-02-01 NOTE — PROGRESS NOTES
02/01/24 1440   [facility] Group Therapy   Group Name Therapeutic Recreation   Specific Interventions Coping Skills Training   Participation Level Appropriate   Participation Quality Cooperative   Insight/Motivation Applies New Skills;Improved   Affect/Mood Display Other (see comments)   Cognition Alert   Psychomotor WNL     Patient presents willing to participate, reports she is still feeling hopeful, little down, but better, learned a new skilled leisure activity. Patient exercised cognitive skills, used strategy and matching skills, remained involved and enjoyed the activity.

## 2024-02-01 NOTE — PSYCH
[facility] discussed with pt on collateral consent. Pt signed collateral consent for Ray Rose/ [phone]. [facility] attemtped to contact collateral consent to discuss pt admission.  stated:        Reason for admission- describe what happened?      She never was officially diagnosed with OCD. In 2020 she went to [facility] and was officially diagnosed with the OCD and was prescribed medications and she was doing very well until she got pregnant and she had to get her off the medications at  20 weeks of her pregnancy. Then she went back to the doctor and he told her to wait until the 30 th week and then since it was so long it just got worse without the medication. She wanted to check into  a facility. I noticed at home when I would throw away like a take out bag of food she was asking me what I was throwing it way. For example, I bought something and I had a receipt in my hand and then she asked what I did I do with the receipt and if I threw it away  she would continue to obsess about it and everything else.        Prior treatment places and dates-doctor name and location  Reason for prior treatment- same or different  How long has patient had problem(s)?    She has been having OCD for about 6 years and even before she has had OCD. She was obsessing about things  as a child from what I gather from her parents.        Substance abuse- what , how long, how much, how often?    None to my knowledge    Legal Issues- Current charges, type of offense, probation or parole?   None to my knowledge    History of suicide attempts- when and what methods, did they require medical attention?    Not that I am away of.     Alcohol Use-  What preference of alcohol, how much, how long, how often?   Not to my knowledge    History of violence-describe behaviors and triggers      Not to my knowledge  Any guns or weapons in the home? If yes, recommend that these be secured.    Thre are no guns within our home. I will make sure  upon her discharge that all sharp objects are secured.     What is patients baseline behavior/mood- how well do they know if patient is doing well?    If she is doing ok she is laughing and joking a lot and talkative and always in a bubbly mood.    What helps the patient stay well?  Internal/external coping strategies ( attending meetings, going to groups, taking medications, spending time with family ( etc)?    Taking her medications, spending time with her family and spending time with me and loves doing puzzles and hanging out with her friends.    Discharge plan:    Where will the patient live upon discharge?    She will return home.     Who else in the home?    Just myself and her.    Will someone be able to pick the patient up when discharged?    I will come to meet her or she will drive herself home.

## 2024-02-01 NOTE — PROGRESS NOTES
"   02/01/24 1000   [facility] Group Therapy   Group Name Therapeutic Recreation   Specific Interventions Cognitive Stimulation Training   Participation Level Appropriate   Participation Quality Cooperative   Insight/Motivation Improved;Applies New Skills   Affect/Mood Display Depressed   Cognition Alert   Psychomotor WNL     Patient presents willing to participate, reports "I'm still down, but up a little, more hopeful. I feel reassured by the doctor that there is hope." Patient reports a "little tired" mood, "I didn't sleep good." Patient verbalized she is still having repetitive thoughts and fixation on things. Patient remained involved and enjoyed the activity.  "

## 2024-02-01 NOTE — PLAN OF CARE
Lying quietly in bed, eyes closed, respirations even, unlabored. Apparently asleep. Slept 3 hours thus far with one interruption. Safety precautions maintained. Rounds done every 15 minutes. Bed is fixed in low position and room is uncluttered and pathways are clear.

## 2024-02-01 NOTE — PLAN OF CARE
Patient out on unit most of the evening. Interacts well with peers. Depressed mood tearful at times. Pt rates her depression 2/10 on scale 1-10 with 10 highest level of depression. Pt rates anxiety 2/10 also on same scale. Pt states her OCD is out of control and is making her depressed. Patient denies SI/HI. Med compliant. Accepts all meals and meds without difficulty. Discussed healthy coping skills and techniques. Educated, reviewed and discussed plan of care and medication regiment with this patient. Patient voiced understanding of all teachings.

## 2024-02-01 NOTE — PROGRESS NOTES
"PSYCHIATRY DAILY INPATIENT PROGRESS NOTE  SUBSEQUENT HOSPITAL VISIT    ENCOUNTER DATE: 2/1/2024  [facility]    DATE OF ADMISSION: 1/29/2024  8:30 PM  LENGTH OF STAY: 3 days      CHIEF COMPLAINT   Ellen Rose is a 28 y.o. female, seen during daily rivera rounds on the inpatient unit.  Ellen Rose presented with the chief complaint of  obsessive thinking and depression with passive SI, "I needed to get back on medications."       The patient was seen and examined. The chart was reviewed.     Reviewed notes from Rns, CTRS, RD, and LPC and labs from the last 24 hours.    The patient's case was discussed with the treatment team/care providers today including Rns and LPC    Staff reports no behavioral or management issues.     The patient has been compliant with treatment.      Subjective 02/01/2024       Today the patient reports that she is doing "ok ." Overall, symptoms persists minimally to mildly changed from yesterday.  -Obsessive thinking persists; no compulsions noted  -Anxiety persists  -Depression persist with less intense/frequent passive SI. No changes since yesterday    She discussed her stressors. We discussed coping skills.    She continues to note +daily fetal movements.      We discussed the r/b of tx vs alternative tx vs no treatment for her and her child; she feels that that the risks of no tx or greater than tx; she wants to continue pharmacotherapy as documented below.       The patient denies any side effects to medications.            Psychiatric ROS (observed, reported, or endorsed/denied):  Depressed mood - Continuing  Interest/pleasure/anhedonia: Continuing  Guilt/hopelessness/worthlessness - Continuing  Changes in Sleep - denies  Changes in Appetite - variable  Changes in Concentration - variable  Changes in Energy - variable  PMA/R- No  Suicidal- active/passive ideations - decreasing slowly  Homicidal ideations: active/passive ideations - No    Hallucinations - No  Delusions - " No  Disorganized behavior - No  Disorganized speech - No  Negative symptoms - No    Elevated mood - No  Decreased need for sleep - No  Grandiosity - No  Racing thoughts - No  Impulsivity - No  Irritability- No  Increased energy - No  Distractibility - No  Increase in goal-directed activity or PMA- No    Symptoms of ABRIL - denies  Symptoms of Panic Disorder- stable  Symptoms of PTSD - denies        Overall progress: Patient is showing mild improvement         Psychotherapy:  Target symptoms: depression, anxiety   Why chosen therapy is appropriate versus another modality: relevant to diagnosis, patient responds to this modality, evidence based practice  Outcome monitoring methods: self-report, observation  Therapeutic intervention type: insight oriented psychotherapy, behavior modifying psychotherapy, supportive psychotherapy, interactive psychotherapy  Topics discussed/themes: building skills sets for symptom management, symptom recognition  The patient's response to the intervention is accepting. The patient's progress toward treatment goals is fair.   Duration of intervention: 40 minutes.        Medical ROS  General ROS: negative  Ophthalmic ROS: negative  ENT ROS: negative  Allergy and Immunology ROS: negative  Hematological and Lymphatic ROS: negative  Endocrine ROS: negative  Respiratory ROS: no cough, shortness of breath, or wheezing  Cardiovascular ROS: no chest pain or dyspnea on exertion  Gastrointestinal ROS: no abdominal pain, change in bowel habits, or black or bloody stools  Genito-Urinary ROS: no dysuria, trouble voiding, or hematuria  Musculoskeletal ROS: negative  Neurological ROS: no TIA or stroke symptoms  Dermatological ROS: negative      PAST MEDICAL HISTORY   Past Medical History:   Diagnosis Date    Major depressive disorder, single episode, unspecified     OCD (obsessive compulsive disorder)            PSYCHOTROPIC MEDICATIONS   Scheduled Meds:   prenatal vitamin  1 tablet Oral Daily     sertraline  50 mg Oral Daily     Continuous Infusions:  PRN Meds:.acetaminophen, aluminum-magnesium hydroxide-simethicone, benzonatate, benztropine mesylate, hydrOXYzine pamoate, loperamide, nicotine, OLANZapine **AND** OLANZapine, ondansetron, promethazine        EXAMINATION    VITALS   Vitals:    01/31/24 0732 01/31/24 0800 01/31/24 1905 02/01/24 0726   BP: (!) 110/55  107/64 (!) 106/59   BP Location: Right arm  Left arm Left arm   Patient Position: Lying  Sitting Lying   Pulse: 86  86 88   Resp: 18  18 18   Temp: 97.4 °F (36.3 °C)  97.8 °F (36.6 °C) 96.7 °F (35.9 °C)   TempSrc: Temporal  Temporal Temporal   SpO2:   96%    Weight:  80.4 kg (177 lb 4 oz)     Height:           Body mass index is 34.62 kg/m².      CONSTITUTIONAL  General Appearance: unremarkable, age appropriate, normal weight, well nourished, casually dressed     MUSCULOSKELETAL  Muscle Strength and Tone:no tremor, no tic  Abnormal Involuntary Movements: No  Gait and Station: non-ataxic     PSYCHIATRIC   Level of Consciousness: awake and alert   Orientation: person, place, time, and situation  Grooming: Casually dressed and Well groomed  Psychomotor Behavior: normal, cooperative, friendly and cooperative, eye contact normal  Speech: normal tone, normal rate, normal pitch, normal volume  Language: grossly intact  Mood: neutral and sad  Affect: Consistent with mood  Thought Process: linear, logical  Associations: intact   Thought Content: denies SI and denies HI  Perceptions: denies AH and denies  VH  Memory: Able to recall past events, Remote intact, and Recent intact  Attention:Attends to interview without distraction  Fund of Knowledge: Aware of current events and Vocabulary appropriate   Estimate if Intelligence:  Average based on work/education history, vocabulary and mental status exam  Insight: has awareness of illness  Judgment: behavior is adequate to circumstances        DIAGNOSTIC TESTING   Laboratory Results  No results found for this or  any previous visit (from the past 24 hour(s)).          MEDICAL DECISION MAKING      ASSESSMENT:   MDD, recurrent, severe without psychotic features   OCD  Unspecified anxiety disorder (panic attacks)     Psychosocial stressors      IUP- approximately 35 weeks     Elevated liver enzymes  ELevated blood glucose         PROBLEM LIST AND MANAGEMENT PLANS     Depression: pt counseled  -start trial of Zoloft at 25 mg po q day- increase to 50 mg po q day today (goal dose is 100 mg daily)    OCD: pt counseled  -zoloft as above     Anxiety: pt counseled  -zoloft as above  -vistaril prn     Psychosocial stressors: pt counseled  -SW consulted to assist with resources       IUP- approximately 35 weeks: pt counseled  -checking fetal heart tones- WNL  -will consult Ob as/if needed     Elevated liver enzymes: pt counseled     ELevated blood glucose: pt counseled  -check HgA1c- 4.9           Discussed diagnosis, risks and benefits of proposed treatment vs alternative treatments vs no treatment, potential side effects of these treatments and the inherent unpredictability of treatment. The patient expresses understanding of the above and displays the capacity to agree with this treatment given said understanding. Patient also agrees that, currently, the benefits outweigh the risks and would like to pursue/continue treatment at this time.    Any medications being used off-label were discussed with the patient inclusive of the evidence base for the use of the medications and consent was obtained for the off-label use of the medication.       DISCHARGE PLANNING  Expected Disposition Plan: Home or Self Care      NEED FOR CONTINUED HOSPITALIZATION  Psychiatric illness continues to pose a potential threat to life or bodily function, of self or others, thereby requiring the need for continued inpatient psychiatric hospitalization: Yes, due to: danger to self, gravely disabled, and suicidal ideation, as evidenced by:  Ongoing concerns with  SI. and Ongoing concerns with grave disability with patient unable to perform basic feeding, hygiene and dressing activities without significant constant support.    Protective inpatient pyschiatric hospitalization required while a safe disposition plan is enacted: Yes    Patient stabilized and ready for discharge from inpatient psychiatric unit: No        STAFF:   Ray Sumner MD  Psychiatry

## 2024-02-01 NOTE — MEDICAL/APP STUDENT
PSYCHIATRY DAILY INPATIENT PROGRESS NOTE  SUBSEQUENT HOSPITAL VISIT    ENCOUNTER DATE: 2/1/2024  SITE: [facility]    DATE OF ADMISSION: 1/29/2024  8:30 PM  LENGTH OF STAY: 3 days      CHIEF COMPLAINT   Ellen Rose is a 28 y.o. female, seen during daily rivera rounds on the inpatient unit.  Ellen Rose presented with the chief complaint of {CC:19439}      The patient was seen and examined. The chart was reviewed.     Reviewed notes from Rns, MD, and CTRS and labs from the last 24 hours.    The patient's case was discussed with the treatment team/care providers today including {staffnotes:58971}    Staff reports {staffprobs:94411} behavioral or management issues.     The patient has been {compliance:32104} with treatment.      Subjective 02/01/2024       Today the patient reports ***    Obsessional thought has not decreased.       The patient denies any side effects to medications.        Interim/overnight events per report/notes:          Psychiatric ROS (observed, reported, or endorsed/denied):  Depressed mood - Continuing  Interest/pleasure/anhedonia: No  Guilt/hopelessness/worthlessness - Continuing  Changes in Sleep - Yes  Changes in Appetite - No  Changes in Concentration - No  Changes in Energy - No  PMA/R- No  Suicidal- active/passive ideations - Yes/passive  Homicidal ideations: active/passive ideations - No    Hallucinations - No  Delusions - No  Disorganized behavior - No  Disorganized speech - No  Negative symptoms - No    Elevated mood - No  Decreased need for sleep - No  Grandiosity - No  Racing thoughts - Yes/concern bout thrown out trash/replaying conversations   Impulsivity - No  Irritability- No  Increased energy - No  Distractibility - No  Increase in goal-directed activity or PMA- No    Symptoms of ABRIL - Yes  Symptoms of Panic Disorder- No  Symptoms of PTSD - No        Overall progress: Patient is showing mild improvement         Psychotherapy:  Target symptoms: anxiety   Why chosen therapy  is appropriate versus another modality: {reason:93468}  Outcome monitoring methods: {methods:16993}  Therapeutic intervention type: {types:33994}  Topics discussed/themes: {Topics:20403}  The patient's response to the intervention is {PSY INTERVENTION RESPONSE:18686}. The patient's progress toward treatment goals is {Progress:20262}.   Duration of intervention: *** minutes.        Medical ROS  ROS      PAST MEDICAL HISTORY   Past Medical History:   Diagnosis Date    Major depressive disorder, single episode, unspecified     OCD (obsessive compulsive disorder)            PSYCHOTROPIC MEDICATIONS   Scheduled Meds:   prenatal vitamin  1 tablet Oral Daily    sertraline  50 mg Oral Daily     Continuous Infusions:  PRN Meds:.acetaminophen, aluminum-magnesium hydroxide-simethicone, benzonatate, benztropine mesylate, hydrOXYzine pamoate, loperamide, nicotine, OLANZapine **AND** OLANZapine, ondansetron, promethazine        EXAMINATION    VITALS   Vitals:    01/31/24 0732 01/31/24 0800 01/31/24 1905 02/01/24 0726   BP: (!) 110/55  107/64 (!) 106/59   BP Location: Right arm  Left arm Left arm   Patient Position: Lying  Sitting Lying   Pulse: 86  86 88   Resp: 18  18 18   Temp: 97.4 °F (36.3 °C)  97.8 °F (36.6 °C) 96.7 °F (35.9 °C)   TempSrc: Temporal  Temporal Temporal   SpO2:   96%    Weight:  80.4 kg (177 lb 4 oz)     Height:           Body mass index is 34.62 kg/m².        CONSTITUTIONAL  General Appearance: unremarkable, age appropriate    MUSCULOSKELETAL  Muscle Strength and Tone:no tremor, no tic  Abnormal Involuntary Movements: No  Gait and Station: non-ataxic    PSYCHIATRIC   Level of Consciousness: awake and alert   Orientation: person, place, time, situation, day of week, month of year, and year  Grooming: Casually dressed and Well groomed  Psychomotor Behavior: normal, cooperative, friendly and cooperative, eye contact normal  Speech: normal tone, normal rate, normal pitch, normal volume  Language: grossly  intact  Mood: anxious, neutral, sad, and the same/no change  Affect: Consistent with mood  Thought Process: linear, logical  Associations: intact   Thought Content: less SI and denies HI  Perceptions: denies AH and denies  VH  Memory: Able to recall past events, Remote intact, and Recent intact  Attention:Attends to interview without distraction  Fund of Knowledge: Aware of current events and Vocabulary appropriate   Estimate if Intelligence:  Average based on work/education history, vocabulary and mental status exam  Insight: has awareness of illness  Judgment: behavior is adequate to circumstances        DIAGNOSTIC TESTING   Laboratory Results  No results found for this or any previous visit (from the past 24 hour(s)).          MEDICAL DECISION MAKING      ASSESSMENT:   ***        PROBLEM LIST AND MANAGEMENT PLANS    ***            Discussed diagnosis, risks and benefits of proposed treatment vs alternative treatments vs no treatment, potential side effects of these treatments and the inherent unpredictability of treatment. The patient expresses understanding of the above and displays the capacity to agree with this treatment given said understanding. Patient also agrees that, currently, the benefits outweigh the risks and would like to pursue/continue treatment at this time.    Any medications being used off-label were discussed with the patient inclusive of the evidence base for the use of the medications and consent was obtained for the off-label use of the medication.       DISCHARGE PLANNING  Expected Disposition Plan: {Select Anticipated Discharge Plan:21228}      NEED FOR CONTINUED HOSPITALIZATION  Psychiatric illness continues to pose a potential threat to life or bodily function, of self or others, thereby requiring the need for continued inpatient psychiatric hospitalization: Yes, due to: {PSY IP JUSTIFICATION FOR CONTINUED ACUTE CARE HOSPITALIZATION:85107}, as evidenced by:  {just:58581}    Protective  inpatient pyschiatric hospitalization required while a safe disposition plan is enacted: Yes    Patient stabilized and ready for discharge from inpatient psychiatric unit: No        STAFF:   Diaz Justice MS3  Psychiatry

## 2024-02-02 PROCEDURE — 99233 SBSQ HOSP IP/OBS HIGH 50: CPT | Mod: ,,, | Performed by: PSYCHIATRY & NEUROLOGY

## 2024-02-02 PROCEDURE — 11400000 HC PSYCH PRIVATE ROOM

## 2024-02-02 PROCEDURE — 90836 PSYTX W PT W E/M 45 MIN: CPT | Mod: ,,, | Performed by: PSYCHIATRY & NEUROLOGY

## 2024-02-02 PROCEDURE — 25000003 PHARM REV CODE 250: Performed by: PSYCHIATRY & NEUROLOGY

## 2024-02-02 RX ADMIN — SERTRALINE HYDROCHLORIDE 50 MG: 50 TABLET ORAL at 08:02

## 2024-02-02 RX ADMIN — PRENATAL VIT W/ FE FUMARATE-FA TAB 27-0.8 MG 1 TABLET: 27-0.8 TAB at 08:02

## 2024-02-02 RX ADMIN — HYDROXYZINE PAMOATE 100 MG: 50 CAPSULE ORAL at 08:02

## 2024-02-02 NOTE — PLAN OF CARE
Patient out of room most of the day. Minimal interacting with peers. Pt calm and cooperative. Pt denies anxiety and rates depression 5/10 on scale 1-10 with 10 being worse level. Tearful at times. Patient denies SI/HI no self harming behavior displayed. Patient contracted safety with staff and unit. Accepts all meals and meds without difficulty. Discussed healthy coping skills and techniques. Educated, reviewed  and discussed plan of care and medication regiment with this patient. Patient voiced understanding of all teachings.

## 2024-02-02 NOTE — PLAN OF CARE
Lying quietly in bed, eyes closed, respirations even, unlabored. Apparently asleep. Slept 4 hours thus far without interruption. Safety precautions maintained. Rounds done every 15 minutes. Bed is fixed in low position and room is uncluttered and pathways are clear.

## 2024-02-02 NOTE — PROGRESS NOTES
02/02/24 1445   [facility] Group Therapy   Group Name Stress Management   Specific Interventions Relaxation Training   Participation Level Minimal   Participation Quality Cooperative   Insight/Motivation Good   Affect/Mood Display Other (see comments)   Cognition Alert   Psychomotor WNL     Patient was sitting in the [place] waiting to ask the doctor something when approached. Patient lacks interest, wanted to go outside and get some fresh air, had minimal interaction with peers, was in and out of group activity of watching a comedy movie to promote entertainment, stress reduction and social connectivity.

## 2024-02-02 NOTE — PROGRESS NOTES
"PSYCHIATRY DAILY INPATIENT PROGRESS NOTE  SUBSEQUENT HOSPITAL VISIT    ENCOUNTER DATE: 2/2/2024  [facility]    DATE OF ADMISSION: 1/29/2024  8:30 PM  LENGTH OF STAY: 4 days      CHIEF COMPLAINT   Ellen Rose is a 28 y.o. female, seen during daily rivera rounds on the inpatient unit.  Ellen Rose presented with the chief complaint of  obsessive thinking and depression with passive SI, "I needed to get back on medications."       The patient was seen and examined. The chart was reviewed.     Reviewed notes from Rns, CTRS, RD, and LPC and labs from the last 24 hours.    The patient's case was discussed with the treatment team/care providers today including Rns and LPC    Staff reports no behavioral or management issues.     The patient has been compliant with treatment.      Subjective 02/02/2024       Today the patient reports that she is doing "ok ." Overall, symptoms persists minimally to mildly changed from yesterday.  -Obsessive thinking persists and remain distressing and impairing; no compulsions noted  -Anxiety persist  -Depression persist with less intense/frequent passive SI. No changes since yesterday    She discussed her stressors. We discussed coping skills.    She continues to note +daily fetal movements.      We discussed the r/b of tx vs alternative tx vs no treatment for her and her child; she feels that that the risks of no tx or greater than tx; she wants to continue pharmacotherapy as documented below.       The patient denies any side effects to medications.            Psychiatric ROS (observed, reported, or endorsed/denied):  Depressed mood - variable  Interest/pleasure/anhedonia: variable  Guilt/hopelessness/worthlessness - variable  Changes in Sleep - denies  Changes in Appetite - variable  Changes in Concentration - variable  Changes in Energy - variable  PMA/R- No  Suicidal- active/passive ideations - decreasing steadily  Homicidal ideations: active/passive ideations - " No    Hallucinations - No  Delusions - No  Disorganized behavior - No  Disorganized speech - No  Negative symptoms - No    Elevated mood - No  Decreased need for sleep - No  Grandiosity - No  Racing thoughts - No  Impulsivity - No  Irritability- No  Increased energy - No  Distractibility - No  Increase in goal-directed activity or PMA- No    Symptoms of ABRIL - denies  Symptoms of Panic Disorder- stable  Symptoms of PTSD - denies        Overall progress: Patient is showing mild improvement         Psychotherapy:  Target symptoms: depression, anxiety   Why chosen therapy is appropriate versus another modality: relevant to diagnosis, patient responds to this modality, evidence based practice  Outcome monitoring methods: self-report, observation  Therapeutic intervention type: insight oriented psychotherapy, behavior modifying psychotherapy, supportive psychotherapy, interactive psychotherapy  Topics discussed/themes: building skills sets for symptom management, symptom recognition  The patient's response to the intervention is accepting. The patient's progress toward treatment goals is fair.   Duration of intervention: 40 minutes.        Medical ROS  General ROS: negative  Ophthalmic ROS: negative  ENT ROS: negative  Allergy and Immunology ROS: negative  Hematological and Lymphatic ROS: negative  Endocrine ROS: negative  Respiratory ROS: no cough, shortness of breath, or wheezing  Cardiovascular ROS: no chest pain or dyspnea on exertion  Gastrointestinal ROS: no abdominal pain, change in bowel habits, or black or bloody stools  Genito-Urinary ROS: no dysuria, trouble voiding, or hematuria  Musculoskeletal ROS: negative  Neurological ROS: no TIA or stroke symptoms  Dermatological ROS: negative      PAST MEDICAL HISTORY   Past Medical History:   Diagnosis Date    Major depressive disorder, single episode, unspecified     OCD (obsessive compulsive disorder)            PSYCHOTROPIC MEDICATIONS   Scheduled Meds:   prenatal  vitamin  1 tablet Oral Daily    sertraline  50 mg Oral Daily     Continuous Infusions:  PRN Meds:.acetaminophen, aluminum-magnesium hydroxide-simethicone, benzonatate, benztropine mesylate, hydrOXYzine pamoate, loperamide, nicotine, OLANZapine **AND** OLANZapine, ondansetron, promethazine        EXAMINATION    VITALS   Vitals:    01/31/24 1905 02/01/24 0726 02/01/24 2100 02/02/24 0800   BP: 107/64 (!) 106/59 128/72 (!) 107/55   BP Location: Left arm Left arm Left arm Right arm   Patient Position: Sitting Lying Lying Sitting   Pulse: 86 88 93 82   Resp: 18 18 20 19   Temp: 97.8 °F (36.6 °C) 96.7 °F (35.9 °C) 97.4 °F (36.3 °C) 97 °F (36.1 °C)   TempSrc: Temporal Temporal Temporal Temporal   SpO2: 96%      Weight:       Height:           Body mass index is 34.62 kg/m².      CONSTITUTIONAL  General Appearance: unremarkable, age appropriate, normal weight, well nourished, casually dressed     MUSCULOSKELETAL  Muscle Strength and Tone:no tremor, no tic  Abnormal Involuntary Movements: No  Gait and Station: non-ataxic     PSYCHIATRIC   Level of Consciousness: awake and alert   Orientation: person, place, time, and situation  Grooming: Casually dressed and Well groomed  Psychomotor Behavior: normal, cooperative, friendly and cooperative, eye contact normal  Speech: normal tone, normal rate, normal pitch, normal volume  Language: grossly intact  Mood: neutral and sad  Affect: Consistent with mood  Thought Process: linear, logical  Associations: intact   Thought Content: denies SI and denies HI  Perceptions: denies AH and denies  VH  Memory: Able to recall past events, Remote intact, and Recent intact  Attention:Attends to interview without distraction  Fund of Knowledge: Aware of current events and Vocabulary appropriate   Estimate if Intelligence:  Average based on work/education history, vocabulary and mental status exam  Insight: has awareness of illness  Judgment: behavior is adequate to circumstances        DIAGNOSTIC  TESTING   Laboratory Results  No results found for this or any previous visit (from the past 24 hour(s)).          MEDICAL DECISION MAKING      ASSESSMENT:   MDD, recurrent, severe without psychotic features   OCD  Unspecified anxiety disorder (panic attacks)     Psychosocial stressors      IUP- approximately 35 weeks     Elevated liver enzymes  ELevated blood glucose         PROBLEM LIST AND MANAGEMENT PLANS     Depression: pt counseled  -start trial of Zoloft at 25 mg po q day- increase to 50 mg po q day- increase to 75 mg po q day tomorrow (goal dose is 100-150 mg daily)    OCD: pt counseled  -zoloft as above     Anxiety: pt counseled  -zoloft as above  -vistaril prn     Psychosocial stressors: pt counseled  -SW consulted to assist with resources       IUP- approximately 35 weeks: pt counseled  -checking fetal heart tones- WNL  -will consult Ob as/if needed     Elevated liver enzymes: pt counseled     ELevated blood glucose: pt counseled  -check HgA1c- 4.9           Discussed diagnosis, risks and benefits of proposed treatment vs alternative treatments vs no treatment, potential side effects of these treatments and the inherent unpredictability of treatment. The patient expresses understanding of the above and displays the capacity to agree with this treatment given said understanding. Patient also agrees that, currently, the benefits outweigh the risks and would like to pursue/continue treatment at this time.    Any medications being used off-label were discussed with the patient inclusive of the evidence base for the use of the medications and consent was obtained for the off-label use of the medication.       DISCHARGE PLANNING  Expected Disposition Plan: Home or Self Care      NEED FOR CONTINUED HOSPITALIZATION  Psychiatric illness continues to pose a potential threat to life or bodily function, of self or others, thereby requiring the need for continued inpatient psychiatric hospitalization: Yes, due to:  danger to self, gravely disabled, and suicidal ideation, as evidenced by:  Ongoing concerns with SI. and Ongoing concerns with grave disability with patient unable to perform basic feeding, hygiene and dressing activities without significant constant support.    Protective inpatient pyschiatric hospitalization required while a safe disposition plan is enacted: Yes    Patient stabilized and ready for discharge from inpatient psychiatric unit: No        STAFF:   Ray Sumner MD  Psychiatry

## 2024-02-02 NOTE — PLAN OF CARE
Plan of care reviewed. Denies intent to harm self or others at this time. Denies hallucinations. States continues to have intrusive thoughts but these have improved. States she understands that it may take a while to get these thoughts under control. Accepts snacks and medications. Gait steady, no falls. Interacting with staff and peers. Denies pain. Will continue precautions and monitor for safety.

## 2024-02-02 NOTE — PROGRESS NOTES
"   02/02/24 1015   [facility] Group Therapy   Group Name Therapeutic Recreation   Specific Interventions Cognitive Stimulation Training   Participation Level Appropriate;Attentive;Sharing   Participation Quality Cooperative   Insight/Motivation Applies New Skills;Good   Affect/Mood Display Other (see comments)   Cognition Alert   Psychomotor WNL     Patient presents willing to participate, shows interest, remained alert and involved. Patient reports a "not super great. I find that my thoughts are increasing." Patient verbalized she is still repetitive and fixated on things.  "

## 2024-02-03 PROCEDURE — 25000003 PHARM REV CODE 250: Performed by: PSYCHIATRY & NEUROLOGY

## 2024-02-03 PROCEDURE — 11400000 HC PSYCH PRIVATE ROOM

## 2024-02-03 PROCEDURE — 99232 SBSQ HOSP IP/OBS MODERATE 35: CPT | Mod: ,,, | Performed by: PSYCHIATRY & NEUROLOGY

## 2024-02-03 RX ADMIN — SERTRALINE HYDROCHLORIDE 75 MG: 50 TABLET ORAL at 09:02

## 2024-02-03 RX ADMIN — PRENATAL VIT W/ FE FUMARATE-FA TAB 27-0.8 MG 1 TABLET: 27-0.8 TAB at 09:02

## 2024-02-03 RX ADMIN — HYDROXYZINE PAMOATE 50 MG: 50 CAPSULE ORAL at 10:02

## 2024-02-03 NOTE — NURSING
Pt sleeping at this time, slept 3 hrs with 1 awakenings. NAD. Resp even and unlabored.Pathways clear,bed in low position. Q 15 min safety check ongoing.All precautions maintained.   Billing Type: Third-Party Bill Expected Date Of Service: 04/17/2023 Bill For Surgical Tray: no

## 2024-02-03 NOTE — PROGRESS NOTES
"PSYCHIATRY DAILY INPATIENT PROGRESS NOTE  SUBSEQUENT HOSPITAL VISIT    ENCOUNTER DATE: 2/3/2024  [facility]    DATE OF ADMISSION: 1/29/2024  8:30 PM  LENGTH OF STAY: 5 days      CHIEF COMPLAINT   Ellen Rose is a 28 y.o. female, seen during daily rivera rounds on the inpatient unit.  Ellen Rose presented with the chief complaint of  obsessive thinking and depression with passive SI, "I needed to get back on medications."       The patient was seen and examined. The chart was reviewed.     Reviewed notes from Rns, CTRS, RD, and LPC and labs from the last 24 hours.    The patient's case was discussed with the treatment team/care providers today including Rns and LPC    Staff reports no behavioral or management issues.     The patient has been compliant with treatment.      Subjective 02/03/2024       Today the patient reports that she is doing "alright ." Overall, symptoms persists minimally to mildly changed from yesterday. She continues to report depression, and she appears depressed with +PMR. She is however insightful, and has interest in prevention of worsening of her symptoms after the baby comes. She continues to report lack of excitement regarding the due date approaching. This is upsetting to her, and she is hoping that treatment of her depression will soon result in some improvement in this.     -Obsessive thinking persists and remain distressing and impairing; no compulsions noted again today.   -Anxiety persists, and pt has concerns about her depression worsening after she has the baby.   -Depression persist with less intense/frequent passive SI. No changes since yesterday    She continues to note +daily fetal movements.      We discussed the r/b of tx vs alternative tx vs no treatment for her and her child; she feels that that the risks of no tx or greater than tx; she wants to continue pharmacotherapy as documented below.       The patient denies any side effects to " medications.            Psychiatric ROS (observed, reported, or endorsed/denied):  Depressed mood - variable  Interest/pleasure/anhedonia: variable  Guilt/hopelessness/worthlessness - variable  Changes in Sleep - denies  Changes in Appetite - variable  Changes in Concentration - variable  Changes in Energy - variable  PMA/R- No  Suicidal- active/passive ideations - decreasing steadily  Homicidal ideations: active/passive ideations - No    Hallucinations - No  Delusions - No  Disorganized behavior - No  Disorganized speech - No  Negative symptoms - No    Elevated mood - No  Decreased need for sleep - No  Grandiosity - No  Racing thoughts - No  Impulsivity - No  Irritability- No  Increased energy - No  Distractibility - No  Increase in goal-directed activity or PMA- No    Symptoms of ABRIL - denies  Symptoms of Panic Disorder- stable  Symptoms of PTSD - denies        Overall progress: Patient is showing mild improvement         Psychotherapy:  Target symptoms: depression, anxiety   Why chosen therapy is appropriate versus another modality: relevant to diagnosis, patient responds to this modality, evidence based practice  Outcome monitoring methods: self-report, observation  Therapeutic intervention type: insight oriented psychotherapy, behavior modifying psychotherapy, supportive psychotherapy, interactive psychotherapy  Topics discussed/themes: building skills sets for symptom management, symptom recognition  The patient's response to the intervention is accepting. The patient's progress toward treatment goals is fair.   Duration of intervention: 40 minutes.        Medical ROS  General ROS: negative  Ophthalmic ROS: negative  ENT ROS: negative  Allergy and Immunology ROS: negative  Hematological and Lymphatic ROS: negative  Endocrine ROS: negative  Respiratory ROS: no cough, shortness of breath, or wheezing  Cardiovascular ROS: no chest pain or dyspnea on exertion  Gastrointestinal ROS: no abdominal pain, change in  bowel habits, or black or bloody stools  Genito-Urinary ROS: no dysuria, trouble voiding, or hematuria  Musculoskeletal ROS: negative  Neurological ROS: no TIA or stroke symptoms  Dermatological ROS: negative      PAST MEDICAL HISTORY   Past Medical History:   Diagnosis Date    Major depressive disorder, single episode, unspecified     OCD (obsessive compulsive disorder)            PSYCHOTROPIC MEDICATIONS   Scheduled Meds:   prenatal vitamin  1 tablet Oral Daily    sertraline  75 mg Oral Daily     Continuous Infusions:  PRN Meds:.acetaminophen, aluminum-magnesium hydroxide-simethicone, benzonatate, benztropine mesylate, hydrOXYzine pamoate, loperamide, nicotine, OLANZapine **AND** OLANZapine, ondansetron, promethazine        EXAMINATION    VITALS   Vitals:    02/01/24 2100 02/02/24 0800 02/02/24 1950 02/03/24 0800   BP: 128/72 (!) 107/55 112/60 (!) 100/57   BP Location: Left arm Right arm Left arm Right arm   Patient Position: Lying Sitting Sitting Sitting   Pulse: 93 82 97 84   Resp: 20 19 18 18   Temp: 97.4 °F (36.3 °C) 97 °F (36.1 °C) 97.4 °F (36.3 °C) 97.6 °F (36.4 °C)   TempSrc: Temporal Temporal Temporal Temporal   SpO2:       Weight:       Height:           Body mass index is 34.62 kg/m².      CONSTITUTIONAL  General Appearance: unremarkable, age appropriate, normal weight, well nourished, casually dressed     MUSCULOSKELETAL  Muscle Strength and Tone:no tremor, no tic  Abnormal Involuntary Movements: No  Gait and Station: non-ataxic     PSYCHIATRIC   Level of Consciousness: awake and alert   Orientation: person, place, time, and situation  Grooming: Casually dressed and Well groomed  Psychomotor Behavior: normal, cooperative, friendly and cooperative, eye contact normal  Speech: normal tone, normal rate, normal pitch, normal volume  Language: grossly intact  Mood: neutral and sad  Affect: Consistent with mood  Thought Process: linear, logical  Associations: intact   Thought Content: denies SI and denies  HI  Perceptions: denies AH and denies  VH  Memory: Able to recall past events, Remote intact, and Recent intact  Attention:Attends to interview without distraction  Fund of Knowledge: Aware of current events and Vocabulary appropriate   Estimate if Intelligence:  Average based on work/education history, vocabulary and mental status exam  Insight: has awareness of illness  Judgment: behavior is adequate to circumstances        DIAGNOSTIC TESTING   Laboratory Results  No results found for this or any previous visit (from the past 24 hour(s)).          MEDICAL DECISION MAKING      ASSESSMENT:   MDD, recurrent, severe without psychotic features   OCD  Unspecified anxiety disorder (panic attacks)     Psychosocial stressors      IUP- approximately 35 weeks     Elevated liver enzymes  ELevated blood glucose         PROBLEM LIST AND MANAGEMENT PLANS     Depression: pt counseled  -start trial of Zoloft at 25 mg po q day- increase to 50 mg po q day- increase to 75 mg po q day tomorrow (goal dose is 100-150 mg daily)    OCD: pt counseled  -zoloft as above     Anxiety: pt counseled  -zoloft as above  -vistaril prn     Psychosocial stressors: pt counseled  -SW consulted to assist with resources       IUP- approximately 35 weeks: pt counseled  -checking fetal heart tones- WNL  -will consult Ob as/if needed     Elevated liver enzymes: pt counseled     ELevated blood glucose: pt counseled  -check HgA1c- 4.9           Discussed diagnosis, risks and benefits of proposed treatment vs alternative treatments vs no treatment, potential side effects of these treatments and the inherent unpredictability of treatment. The patient expresses understanding of the above and displays the capacity to agree with this treatment given said understanding. Patient also agrees that, currently, the benefits outweigh the risks and would like to pursue/continue treatment at this time.    Any medications being used off-label were discussed with the patient  inclusive of the evidence base for the use of the medications and consent was obtained for the off-label use of the medication.       DISCHARGE PLANNING  Expected Disposition Plan: Home or Self Care      NEED FOR CONTINUED HOSPITALIZATION  Psychiatric illness continues to pose a potential threat to life or bodily function, of self or others, thereby requiring the need for continued inpatient psychiatric hospitalization: Yes, due to: danger to self, gravely disabled, and suicidal ideation, as evidenced by:  Ongoing concerns with SI. and Ongoing concerns with grave disability with patient unable to perform basic feeding, hygiene and dressing activities without significant constant support.    Protective inpatient pyschiatric hospitalization required while a safe disposition plan is enacted: Yes    Patient stabilized and ready for discharge from inpatient psychiatric unit: No        STAFF:   Aseal Escalona MD  Psychiatry

## 2024-02-03 NOTE — PLAN OF CARE
Following POC.  Makes needs known.  Denies SI/HI,AVH.  States she is ok.  Out on the unit much of the evening.  Appropriate interactions with staff and peers.  Medication compliant.  Appetite is good.  Free of fall.

## 2024-02-03 NOTE — PLAN OF CARE
Patient calm and cooperative. Walking hallway most of the day. Minimal interaction with peers. Depressed mood tearful at times. Patient denies SI/HI no self harming behavior. Accepts all meds and meals without difficulty. Discussed healthy coping skills and techniques. Educated, reviewed  and discussed plan of care and medication regiment with this patient. Patient voiced understanding of all teachings.

## 2024-02-04 PROCEDURE — 25000003 PHARM REV CODE 250: Performed by: PSYCHIATRY & NEUROLOGY

## 2024-02-04 PROCEDURE — 99232 SBSQ HOSP IP/OBS MODERATE 35: CPT | Mod: ,,, | Performed by: PSYCHIATRY & NEUROLOGY

## 2024-02-04 PROCEDURE — 11400000 HC PSYCH PRIVATE ROOM

## 2024-02-04 RX ADMIN — SERTRALINE HYDROCHLORIDE 75 MG: 50 TABLET ORAL at 08:02

## 2024-02-04 RX ADMIN — HYDROXYZINE PAMOATE 50 MG: 50 CAPSULE ORAL at 11:02

## 2024-02-04 RX ADMIN — HYDROXYZINE PAMOATE 50 MG: 50 CAPSULE ORAL at 10:02

## 2024-02-04 RX ADMIN — PRENATAL VIT W/ FE FUMARATE-FA TAB 27-0.8 MG 1 TABLET: 27-0.8 TAB at 08:02

## 2024-02-04 NOTE — PLAN OF CARE
Pt is sleeping at this time and has slept 2.5 hours intermittently.  Trouble falling asleep.  NAD.  Resp even & unlabored.  Pathways clear.  Q 15 minute safety checks ongoing.  All precautions maintained

## 2024-02-04 NOTE — PROGRESS NOTES
"PSYCHIATRY DAILY INPATIENT PROGRESS NOTE  SUBSEQUENT HOSPITAL VISIT    ENCOUNTER DATE: 2/4/2024  SITE: [facility]    DATE OF ADMISSION: 1/29/2024  8:30 PM  LENGTH OF STAY: 6 days      CHIEF COMPLAINT   Ellen Rose is a 28 y.o. female, seen during daily rivera rounds on the inpatient unit.  Ellen Rose presented with the chief complaint of  obsessive thinking and depression with passive SI, "I needed to get back on medications."       The patient was seen and examined. The chart was reviewed.     Reviewed notes from Rns, CTRS, RD, and LPC and labs from the last 24 hours.    The patient's case was discussed with the treatment team/care providers today including Rns and LPC    Staff reports no behavioral or management issues.     The patient has been compliant with treatment.      Subjective 02/04/2024     Today the patient reports that she is doing "alright ." Overall, symptoms persists minimally to mildly changed from yesterday. Pt did have trouble sleeping last night, which she attributes to a multitude of factors including sleeping during the day yesterday, pregnancy, and lack of bed comfort.     Pt did have a phone call with her  last night that was depressing. States that she called him and he is usually uplifting for her, but last night he was down due to the toll it is taking on him for pt to be in the hospital. This brought down patients mood but she is optimistic that it will improve today.     She continues to report lack of excitement regarding the due date approaching. This is upsetting to her, and she is hoping that treatment of her depression will soon result in some improvement in this.     -Obsessive thinking persists and remain distressing and impairing; no compulsions noted again today.   -Anxiety persists, and pt has concerns about her depression worsening after she has the baby.   -Depression persist with less intense/frequent passive SI. No changes since yesterday    She continues " to note +daily fetal movements.      We discussed the r/b of tx vs alternative tx vs no treatment for her and her child; she feels that that the risks of no tx or greater than tx; she wants to continue pharmacotherapy as documented below.       The patient denies any side effects to medications.            Psychiatric ROS (observed, reported, or endorsed/denied):  Depressed mood - variable  Interest/pleasure/anhedonia: variable  Guilt/hopelessness/worthlessness - variable  Changes in Sleep - denies  Changes in Appetite - variable  Changes in Concentration - variable  Changes in Energy - variable  PMA/R- No  Suicidal- active/passive ideations - decreasing steadily  Homicidal ideations: active/passive ideations - No    Hallucinations - No  Delusions - No  Disorganized behavior - No  Disorganized speech - No  Negative symptoms - No    Elevated mood - No  Decreased need for sleep - No  Grandiosity - No  Racing thoughts - No  Impulsivity - No  Irritability- No  Increased energy - No  Distractibility - No  Increase in goal-directed activity or PMA- No    Symptoms of ABRIL - denies  Symptoms of Panic Disorder- stable  Symptoms of PTSD - denies        Overall progress: Patient is showing mild improvement         Psychotherapy:  Target symptoms: depression, anxiety   Why chosen therapy is appropriate versus another modality: relevant to diagnosis, patient responds to this modality, evidence based practice  Outcome monitoring methods: self-report, observation  Therapeutic intervention type: insight oriented psychotherapy, behavior modifying psychotherapy, supportive psychotherapy, interactive psychotherapy  Topics discussed/themes: building skills sets for symptom management, symptom recognition  The patient's response to the intervention is accepting. The patient's progress toward treatment goals is fair.   Duration of intervention: 40 minutes.        Medical ROS  General ROS: negative  Ophthalmic ROS: negative  ENT ROS:  negative  Allergy and Immunology ROS: negative  Hematological and Lymphatic ROS: negative  Endocrine ROS: negative  Respiratory ROS: no cough, shortness of breath, or wheezing  Cardiovascular ROS: no chest pain or dyspnea on exertion  Gastrointestinal ROS: no abdominal pain, change in bowel habits, or black or bloody stools  Genito-Urinary ROS: no dysuria, trouble voiding, or hematuria  Musculoskeletal ROS: negative  Neurological ROS: no TIA or stroke symptoms  Dermatological ROS: negative      PAST MEDICAL HISTORY   Past Medical History:   Diagnosis Date    Major depressive disorder, single episode, unspecified     OCD (obsessive compulsive disorder)            PSYCHOTROPIC MEDICATIONS   Scheduled Meds:   prenatal vitamin  1 tablet Oral Daily    sertraline  75 mg Oral Daily     Continuous Infusions:  PRN Meds:.acetaminophen, aluminum-magnesium hydroxide-simethicone, benzonatate, benztropine mesylate, hydrOXYzine pamoate, loperamide, nicotine, OLANZapine **AND** OLANZapine, ondansetron, promethazine        EXAMINATION    VITALS   Vitals:    02/03/24 0800 02/03/24 1920 02/04/24 0705 02/04/24 0805   BP: (!) 100/57 119/64 (!) 100/49    BP Location: Right arm  Right arm    Patient Position: Sitting  Sitting    Pulse: 84 93 83    Resp: 18 18 18    Temp: 97.6 °F (36.4 °C) 98.4 °F (36.9 °C) 97.3 °F (36.3 °C)    TempSrc: Temporal  Temporal    SpO2:       Weight:    81.4 kg (179 lb 9 oz)   Height:           Body mass index is 35.07 kg/m².      CONSTITUTIONAL  General Appearance: unremarkable, age appropriate, normal weight, well nourished, casually dressed     MUSCULOSKELETAL  Muscle Strength and Tone:no tremor, no tic  Abnormal Involuntary Movements: No  Gait and Station: non-ataxic     PSYCHIATRIC   Level of Consciousness: awake and alert   Orientation: person, place, time, and situation  Grooming: Casually dressed and Well groomed  Psychomotor Behavior: normal, cooperative, friendly and cooperative, eye contact  normal  Speech: normal tone, normal rate, normal pitch, normal volume  Language: grossly intact  Mood: neutral and sad  Affect: Consistent with mood  Thought Process: linear, logical  Associations: intact   Thought Content: denies SI and denies HI  Perceptions: denies AH and denies  VH  Memory: Able to recall past events, Remote intact, and Recent intact  Attention:Attends to interview without distraction  Fund of Knowledge: Aware of current events and Vocabulary appropriate   Estimate if Intelligence:  Average based on work/education history, vocabulary and mental status exam  Insight: has awareness of illness  Judgment: behavior is adequate to circumstances        DIAGNOSTIC TESTING   Laboratory Results  No results found for this or any previous visit (from the past 24 hour(s)).          MEDICAL DECISION MAKING      ASSESSMENT:   MDD, recurrent, severe without psychotic features   OCD  Unspecified anxiety disorder (panic attacks)     Psychosocial stressors      IUP- approximately 35 weeks     Elevated liver enzymes  ELevated blood glucose         PROBLEM LIST AND MANAGEMENT PLANS     Depression: pt counseled  -start trial of Zoloft at 25 mg po q day- increase to 50 mg po q day- increase to 75 mg po q day on 2/4 (goal dose is 100-150 mg daily)    OCD: pt counseled  -zoloft as above     Anxiety: pt counseled  -zoloft as above  -vistaril prn     Psychosocial stressors: pt counseled  -SW consulted to assist with resources       IUP- approximately 35 weeks: pt counseled  -checking fetal heart tones- WNL  -will consult Ob as/if needed     Elevated liver enzymes: pt counseled     ELevated blood glucose: pt counseled  -check HgA1c- 4.9           Discussed diagnosis, risks and benefits of proposed treatment vs alternative treatments vs no treatment, potential side effects of these treatments and the inherent unpredictability of treatment. The patient expresses understanding of the above and displays the capacity to agree  with this treatment given said understanding. Patient also agrees that, currently, the benefits outweigh the risks and would like to pursue/continue treatment at this time.    Any medications being used off-label were discussed with the patient inclusive of the evidence base for the use of the medications and consent was obtained for the off-label use of the medication.       DISCHARGE PLANNING  Expected Disposition Plan: Home or Self Care      NEED FOR CONTINUED HOSPITALIZATION  Psychiatric illness continues to pose a potential threat to life or bodily function, of self or others, thereby requiring the need for continued inpatient psychiatric hospitalization: Yes, due to: danger to self, gravely disabled, and suicidal ideation, as evidenced by:  Ongoing concerns with SI. and Ongoing concerns with grave disability with patient unable to perform basic feeding, hygiene and dressing activities without significant constant support.    Protective inpatient pyschiatric hospitalization required while a safe disposition plan is enacted: Yes    Patient stabilized and ready for discharge from inpatient psychiatric unit: No        STAFF:   Asael Escalona MD  Psychiatry

## 2024-02-04 NOTE — PLAN OF CARE
Patient calm and cooperative, walking in hallway most of the day. Compliant with meds. Pt interacting well with select peers. Depressed mood tearful at times. Patient denies SI/HI, no self harming behavior displayed. Discussed healthy coping skills and techniques. Educated, reviewed and discussed plan of care and medication regiment with this patient. Patient voiced understanding of all teachings.

## 2024-02-04 NOTE — PLAN OF CARE
The patient remained cooperative this evening while walking the hallways.  No si/hi at this time.  Plan of care reviewed with the patient with all questions answered.

## 2024-02-05 PROCEDURE — 99233 SBSQ HOSP IP/OBS HIGH 50: CPT | Mod: ,,, | Performed by: STUDENT IN AN ORGANIZED HEALTH CARE EDUCATION/TRAINING PROGRAM

## 2024-02-05 PROCEDURE — 11400000 HC PSYCH PRIVATE ROOM

## 2024-02-05 PROCEDURE — 25000003 PHARM REV CODE 250: Performed by: PSYCHIATRY & NEUROLOGY

## 2024-02-05 PROCEDURE — 25000003 PHARM REV CODE 250: Performed by: STUDENT IN AN ORGANIZED HEALTH CARE EDUCATION/TRAINING PROGRAM

## 2024-02-05 PROCEDURE — 90833 PSYTX W PT W E/M 30 MIN: CPT | Mod: ,,, | Performed by: STUDENT IN AN ORGANIZED HEALTH CARE EDUCATION/TRAINING PROGRAM

## 2024-02-05 RX ORDER — SERTRALINE HYDROCHLORIDE 100 MG/1
100 TABLET, FILM COATED ORAL DAILY
Status: DISCONTINUED | OUTPATIENT
Start: 2024-02-06 | End: 2024-02-07

## 2024-02-05 RX ORDER — SERTRALINE HYDROCHLORIDE 25 MG/1
25 TABLET, FILM COATED ORAL ONCE
Status: COMPLETED | OUTPATIENT
Start: 2024-02-05 | End: 2024-02-05

## 2024-02-05 RX ADMIN — HYDROXYZINE PAMOATE 100 MG: 50 CAPSULE ORAL at 08:02

## 2024-02-05 RX ADMIN — HYDROXYZINE PAMOATE 50 MG: 50 CAPSULE ORAL at 01:02

## 2024-02-05 RX ADMIN — SERTRALINE HYDROCHLORIDE 25 MG: 25 TABLET ORAL at 01:02

## 2024-02-05 RX ADMIN — SERTRALINE HYDROCHLORIDE 75 MG: 50 TABLET ORAL at 08:02

## 2024-02-05 RX ADMIN — PRENATAL VIT W/ FE FUMARATE-FA TAB 27-0.8 MG 1 TABLET: 27-0.8 TAB at 08:02

## 2024-02-05 NOTE — NURSING
Pt sleeping at this time, slept 4 hrs with no awakenings. NAD. Resp even and unlabored.Pathways clear,bed in low position. Q 15 min safety check ongoing.All precautions maintained.

## 2024-02-05 NOTE — NURSING
Patient speaking with the PC Marielena. Tearful C/O anxiety. Educated patient on medications. Administered Hydroxyzine 50 MG PO. Patient tolerated well. Will continue to monitor.

## 2024-02-05 NOTE — NURSING
"1:1 with patient.  Pt tearful.  Pt having anxiety.  Feeling she is not feeling as happy and excited about having a baby as she should be.  Feels "pulled" bc her  needs her home to help with all they need to do, but she also does not feel like she is doing any better than when she got here.  Says still having racing thoughts and anxiety and does not feel like the medicine is working.  Book on anxiety provided.  Pt receptive.    "

## 2024-02-05 NOTE — PSYCH
"Pt discussed with PLPC that she was not feeling well and that her anxiety was a bit up today. Pt stated" I am afraid of my how my  is feeling about me being in the hospital and he is struggling with his own mental health." He has stated before that he was going to kill himself and this just makes me anxious.[facility] discussed with pt if she would like to have a three way conversation with PLPC and her with her . Pt states she would like that. [facility] contacted her  Ray [phone] to discuss pt stay and how was he is feeling about her being in the hospital.  states she needs to get the help she needs at this time and if she were to go home they would be back at square one and would have to start the process all over again.  also states he is not fine with her being in the hospital and that he misses her and wants her to get better, but she has to do what is right for her and the baby. Pt states that everyone is asking how she was doing and does not ask him how he is doing and it hurts him that no one Is asking about him. [facility]  on how [facility] was feeling and he states it is a struggle, but wants his wife to get better and to be home to have a safe delivery.  stated that he loved her and that he will call her alter on in the day when he gets home after work. Pt was very tearful and not listening to what her  was telling her and kept crying and was worried about how he is struggling with his mental health. [facility] contacted nurse to see if pt had any medication to help her calm down. RN brought pt medication. [facility] discussed with pt she has to focus on her well being and the babies well being and that her  will be fine and if he would need help he will go and get the help that he needs, but she must focus on her situation at this time. [facility] presented client with meditation with music with client for 10 minutes and had pt do box breathing. Pt states she " was feeling a little better, but was still nervous. PLPC suggested for pt to go and lay down and take a nap  to allow the medication to work. PLPC walked pt to room and then pt came out of room for 5 minutes after PLPC walked her to her room and then went back in room.

## 2024-02-05 NOTE — PLAN OF CARE
"Patient guarded and withdrawn. Soft spoken with a delayed response at times. Patient blunted affect with depressed mood. Patient reports on a scale of 1-10 with ten being the highest level of depression. Patient rating depression today a 9/10. On the same scale patient rating anxiety a 8/10. Encouraged patient to attend groups and activities. Patient denies SI/HI no self harming behavior displayed, no aggressive behavior displayed towards others. Patient contracted safety with staff and unit. Patient interacting with select group of peers. Patient reports " I just don't feel like I am getting better". Discussed medication along with counseling to be continued.  Patient reports the baby is moving and she is eating and drinking water. Discussed healthy coping skills and techniques. Educated, reviewed  and discussed plan of care and medication regiment with this patient. Discussed and educated with this patient any concerns that needed to be addressed along with the importance of medication compliance  1:1 with this patient. Patient voices understanding of all teachings.   "

## 2024-02-05 NOTE — PLAN OF CARE
Following POC.  Makes needs known.  Denies SI/HI, AVH.  Out on the unit much of the evening.  Appropriate interaction with staff and peers.  States she's ok.  Medication complaint.  Appetite is good.  Free of fall.

## 2024-02-05 NOTE — PROGRESS NOTES
"   02/05/24 1400   [facility] Group Therapy   Group Name Other  (1:1)   Specific Interventions Other (see comments)   Participation Level Sharing   Participation Quality Lack of Interest   Insight/Motivation Other (see comments)   Affect/Mood Display Anxious;Depressed   Cognition Alert   Psychomotor WNL     Patient approached the CTRS before group, presents tearful, depressed, anxious. Patient states "can I opt out of group? I'm not feeling good." Patient reports her mental health is about the same and states the medicine isn't working. Patient returned to her room to rest in bed.  "

## 2024-02-05 NOTE — PROGRESS NOTES
"   02/05/24 1010   [facility] Group Therapy   Group Name Therapeutic Recreation   Specific Interventions Cognitive Stimulation Training   Participation Level Appropriate;Attentive;Sharing   Participation Quality Cooperative   Insight/Motivation Applies New Skills;Good   Affect/Mood Display Other (see comments)   Cognition Alert   Psychomotor WNL     Patient cooperative, willing to participate, reports "still the same" mood, "I feel anxious and down. I've become a little more hopeful. I don't feel like a change or the medicine helps." Patient shared she enjoys the activity groups and playing games and the benefits are "preoccupy my time and helps me to be creative."  "

## 2024-02-05 NOTE — PROGRESS NOTES
"PSYCHIATRY DAILY INPATIENT PROGRESS NOTE  SUBSEQUENT HOSPITAL VISIT    ENCOUNTER DATE: 2/5/2024  [facility]    DATE OF ADMISSION: 1/29/2024  8:30 PM  LENGTH OF STAY: 7 days      CHIEF COMPLAINT   Ellen Rose is a 28 y.o. female, seen during daily rivera rounds on the inpatient unit.  Ellen Rose presented with the chief complaint of  obsessive thinking and depression with passive SI, "I needed to get back on medications."       The patient was seen and examined. The chart was reviewed.     Reviewed notes from Rns and labs from the last 24 hours.    The patient's case was discussed with the treatment team/care providers today including Rns    Staff reports no behavioral or management issues.     The patient has been compliant with treatment.        Subjective 02/05/2024     Patient continues to express stress over her 's lack of support for her mental health and she is also expression frustration over her lack of improvement, continues to feel distressed by her symptoms. Encouraged deep breathing and validated patient's concerns as well as provided reassurance. She is interested in attending IOP after discharge.    -Obsessive thinking persists and remain distressing and impairing; no compulsions noted again today.     -Anxiety persists, and pt has concerns about her depression worsening after she has the baby.     -Depression persist with less intense/frequent passive SI. No changes since yesterday    She continues to note +daily fetal movements.      We discussed the r/b of tx vs alternative tx vs no treatment for her and her child; she feels that that the risks of no tx or greater than tx; she wants to continue pharmacotherapy as documented below.       The patient denies any side effects to medications.      Per RN:  1:1 with patient.  Pt tearful.  Pt having anxiety.  Feeling she is not feeling as happy and excited about having a baby as she should be.  Feels "pulled" bc her  needs her " home to help with all they need to do, but she also does not feel like she is doing any better than when she got here.  Says still having racing thoughts and anxiety and does not feel like the medicine is working.  Book on anxiety provided.           Psychiatric ROS (observed, reported, or endorsed/denied):  Depressed mood - Continuing  Interest/pleasure/anhedonia: variable  Guilt/hopelessness/worthlessness - variable  Changes in Sleep - denies  Changes in Appetite - variable  Changes in Concentration - variable  Changes in Energy - variable  PMA/R- No  Suicidal- active/passive ideations - decreasing steadily  Homicidal ideations: active/passive ideations - No    Hallucinations - No  Delusions - No  Disorganized behavior - No  Disorganized speech - No  Negative symptoms - No    Elevated mood - No  Decreased need for sleep - No  Grandiosity - No  Racing thoughts - No  Impulsivity - No  Irritability- No  Increased energy - No  Distractibility - No  Increase in goal-directed activity or PMA- No    Symptoms of ABRIL - denies  Symptoms of Panic Disorder- Continuing  Symptoms of PTSD - denies        Overall progress: Patient is showing mild improvement         Psychotherapy:  Target symptoms: depression, anxiety   Why chosen therapy is appropriate versus another modality: relevant to diagnosis  Outcome monitoring methods: self-report  Therapeutic intervention type: supportive psychotherapy  Topics discussed/themes: building skills sets for symptom management, symptom recognition  The patient's response to the intervention is accepting. The patient's progress toward treatment goals is fair.   Duration of intervention: 16 minutes.          Medical ROS  General ROS: negative  Ophthalmic ROS: negative  ENT ROS: negative  Allergy and Immunology ROS: negative  Hematological and Lymphatic ROS: negative  Endocrine ROS: negative  Respiratory ROS: no cough, shortness of breath, or wheezing  Cardiovascular ROS: no chest pain or  dyspnea on exertion  Gastrointestinal ROS: no abdominal pain, change in bowel habits, or black or bloody stools  Genito-Urinary ROS: no dysuria, trouble voiding, or hematuria  Musculoskeletal ROS: negative  Neurological ROS: no TIA or stroke symptoms  Dermatological ROS: negative      PAST MEDICAL HISTORY   Past Medical History:   Diagnosis Date    Major depressive disorder, single episode, unspecified     OCD (obsessive compulsive disorder)            PSYCHOTROPIC MEDICATIONS   Scheduled Meds:   prenatal vitamin  1 tablet Oral Daily    sertraline  75 mg Oral Daily     Continuous Infusions:  PRN Meds:.acetaminophen, aluminum-magnesium hydroxide-simethicone, benzonatate, benztropine mesylate, hydrOXYzine pamoate, loperamide, nicotine, OLANZapine **AND** OLANZapine, ondansetron, promethazine        EXAMINATION    VITALS   Vitals:    02/04/24 0805 02/04/24 2002 02/05/24 0751 02/05/24 0810   BP:  100/65 (!) 87/52 98/64   BP Location:  Right arm Left arm Left arm   Patient Position:  Sitting Sitting Sitting   Pulse:  105 82    Resp:  18 18    Temp:  98.1 °F (36.7 °C) 98.2 °F (36.8 °C)    TempSrc:  Temporal Temporal    SpO2:       Weight: 81.4 kg (179 lb 9 oz)      Height:           Body mass index is 35.07 kg/m².          CONSTITUTIONAL  General Appearance: unremarkable, age appropriate, normal weight, well nourished, casually dressed     MUSCULOSKELETAL  Muscle Strength and Tone:no tremor, no tic  Abnormal Involuntary Movements: No  Gait and Station: non-ataxic     PSYCHIATRIC   Level of Consciousness: awake and alert   Orientation: person, place, time, and situation  Grooming: Casually dressed and Well groomed  Psychomotor Behavior: normal, cooperative, friendly and cooperative, eye contact normal  Speech: normal tone, normal rate, normal pitch, normal volume  Language: grossly intact  Mood: anxious  Affect: Consistent with mood  Thought Process: linear, logical  Associations: intact   Thought Content: denies SI and  denies HI  Perceptions: denies AH and denies  VH  Memory: Able to recall past events, Remote intact, and Recent intact  Attention:Attends to interview without distraction  Fund of Knowledge: Aware of current events and Vocabulary appropriate   Estimate if Intelligence:  Average based on work/education history, vocabulary and mental status exam  Insight: has awareness of illness  Judgment: behavior is adequate to circumstances        DIAGNOSTIC TESTING   Laboratory Results  No results found for this or any previous visit (from the past 24 hour(s)).        MEDICAL DECISION MAKING      ASSESSMENT:   MDD, recurrent, severe without psychotic features   OCD  Unspecified anxiety disorder (panic attacks)     Psychosocial stressors      IUP- approximately 35 weeks     Elevated liver enzymes  ELevated blood glucose         PROBLEM LIST AND MANAGEMENT PLANS         Depression: pt counseled  -start trial of Zoloft at 25 mg po q day- increase to 50 mg po q day- increase to 75 mg po q day on 2/4 (goal dose is 100-150 mg daily)  -increase to 100 mg PO qd today    OCD: pt counseled  -zoloft as above     Anxiety: pt counseled  -zoloft as above  -vistaril prn     Psychosocial stressors: pt counseled  -SW consulted to assist with resources       IUP- approximately 35 weeks: pt counseled  -checking fetal heart tones- WNL  -will consult Ob as/if needed     Elevated liver enzymes: pt counseled     ELevated blood glucose: pt counseled  -check HgA1c- 4.9           Discussed diagnosis, risks and benefits of proposed treatment vs alternative treatments vs no treatment, potential side effects of these treatments and the inherent unpredictability of treatment. The patient expresses understanding of the above and displays the capacity to agree with this treatment given said understanding. Patient also agrees that, currently, the benefits outweigh the risks and would like to pursue/continue treatment at this time.    Any medications being used  off-label were discussed with the patient inclusive of the evidence base for the use of the medications and consent was obtained for the off-label use of the medication.       DISCHARGE PLANNING  Expected Disposition Plan: Home or Self Care      NEED FOR CONTINUED HOSPITALIZATION  Psychiatric illness continues to pose a potential threat to life or bodily function, of self or others, thereby requiring the need for continued inpatient psychiatric hospitalization: Yes, due to: danger to self, gravely disabled, and suicidal ideation, as evidenced by:  Ongoing concerns with SI. and Ongoing concerns with grave disability with patient unable to perform basic feeding, hygiene and dressing activities without significant constant support.    Protective inpatient pyschiatric hospitalization required while a safe disposition plan is enacted: Yes    Patient stabilized and ready for discharge from inpatient psychiatric unit: No        STAFF:   Loi Gibbs III, MD  Psychiatry

## 2024-02-06 PROCEDURE — 25000003 PHARM REV CODE 250: Performed by: STUDENT IN AN ORGANIZED HEALTH CARE EDUCATION/TRAINING PROGRAM

## 2024-02-06 PROCEDURE — 11400000 HC PSYCH PRIVATE ROOM

## 2024-02-06 PROCEDURE — 99233 SBSQ HOSP IP/OBS HIGH 50: CPT | Mod: ,,, | Performed by: STUDENT IN AN ORGANIZED HEALTH CARE EDUCATION/TRAINING PROGRAM

## 2024-02-06 PROCEDURE — 90833 PSYTX W PT W E/M 30 MIN: CPT | Mod: ,,, | Performed by: STUDENT IN AN ORGANIZED HEALTH CARE EDUCATION/TRAINING PROGRAM

## 2024-02-06 PROCEDURE — 25000003 PHARM REV CODE 250: Performed by: PSYCHIATRY & NEUROLOGY

## 2024-02-06 RX ADMIN — PRENATAL VIT W/ FE FUMARATE-FA TAB 27-0.8 MG 1 TABLET: 27-0.8 TAB at 08:02

## 2024-02-06 RX ADMIN — SERTRALINE HYDROCHLORIDE 100 MG: 100 TABLET ORAL at 08:02

## 2024-02-06 RX ADMIN — HYDROXYZINE PAMOATE 100 MG: 50 CAPSULE ORAL at 10:02

## 2024-02-06 NOTE — PROGRESS NOTES
"PSYCHIATRY DAILY INPATIENT PROGRESS NOTE  SUBSEQUENT HOSPITAL VISIT    ENCOUNTER DATE: 2/6/2024  [facility]    DATE OF ADMISSION: 1/29/2024  8:30 PM  LENGTH OF STAY: 8 days      CHIEF COMPLAINT   Ellen Rose is a 28 y.o. female, seen during daily rivera rounds on the inpatient unit.  Ellen Rose presented with the chief complaint of  obsessive thinking and depression with passive SI, "I needed to get back on medications."       The patient was seen and examined. The chart was reviewed.     Reviewed notes from Rns and labs from the last 24 hours.    The patient's case was discussed with the treatment team/care providers today including Rns    Staff reports no behavioral or management issues.     The patient has been compliant with treatment.        Subjective 02/06/2024     Patient reports had an upsetting conversation with her  yesterday but did feel breathing exercises were helpful. Also encouraged gratitude practice and journaling. Pt expressed interest. She states she is willing to do an IOP after discharge.    -Obsessive thinking persists and remain distressing and impairing; no compulsions noted.     -Anxiety persists, and pt has concerns about her depression worsening after she has the baby.     -Depression persist with less intense/frequent passive SI. No changes since yesterday    She continues to note +daily fetal movements.      We discussed the r/b of tx vs alternative tx vs no treatment for her and her child; she feels that that the risks of no tx or greater than tx; she wants to continue pharmacotherapy as documented below.       The patient denies any side effects to medications.      Per RN:  Patient's mind wandering with helpless, illogical thoughts relating to upcoming childbirth. Patient reports some frustration with spouse because he would want her home to begin to prepare the nursery and "it's not picture perfect like he would want it."  Interacts appropriately with peers and " staff.            Psychiatric ROS (observed, reported, or endorsed/denied):  Depressed mood - Continuing  Interest/pleasure/anhedonia: variable  Guilt/hopelessness/worthlessness - variable  Changes in Sleep - denies  Changes in Appetite - variable  Changes in Concentration - variable  Changes in Energy - variable  PMA/R- No  Suicidal- active/passive ideations - fluctuating  Homicidal ideations: active/passive ideations - No    Hallucinations - No  Delusions - No  Disorganized behavior - No  Disorganized speech - No  Negative symptoms - No    Elevated mood - No  Decreased need for sleep - No  Grandiosity - No  Racing thoughts - No  Impulsivity - No  Irritability- No  Increased energy - No  Distractibility - No  Increase in goal-directed activity or PMA- No    Symptoms of ABRIL - denies  Symptoms of Panic Disorder- Continuing  Symptoms of PTSD - denies        Overall progress: Patient is showing mild improvement         Psychotherapy:  Target symptoms: depression, anxiety   Why chosen therapy is appropriate versus another modality: relevant to diagnosis  Outcome monitoring methods: self-report  Therapeutic intervention type: supportive psychotherapy  Topics discussed/themes: building skills sets for symptom management, symptom recognition  The patient's response to the intervention is accepting. The patient's progress toward treatment goals is fair.   Duration of intervention: 16 minutes.          Medical ROS  General ROS: negative  Ophthalmic ROS: negative  ENT ROS: negative  Allergy and Immunology ROS: negative  Hematological and Lymphatic ROS: negative  Endocrine ROS: negative  Respiratory ROS: no cough, shortness of breath, or wheezing  Cardiovascular ROS: no chest pain or dyspnea on exertion  Gastrointestinal ROS: no abdominal pain, change in bowel habits, or black or bloody stools  Genito-Urinary ROS: no dysuria, trouble voiding, or hematuria  Musculoskeletal ROS: negative  Neurological ROS: no TIA or stroke  symptoms  Dermatological ROS: negative      PAST MEDICAL HISTORY   Past Medical History:   Diagnosis Date    Major depressive disorder, single episode, unspecified     OCD (obsessive compulsive disorder)            PSYCHOTROPIC MEDICATIONS   Scheduled Meds:   prenatal vitamin  1 tablet Oral Daily    sertraline  100 mg Oral Daily     Continuous Infusions:  PRN Meds:.acetaminophen, aluminum-magnesium hydroxide-simethicone, benzonatate, benztropine mesylate, hydrOXYzine pamoate, loperamide, nicotine, OLANZapine **AND** OLANZapine, ondansetron, promethazine        EXAMINATION    VITALS   Vitals:    02/05/24 0810 02/05/24 1430 02/05/24 2000 02/06/24 0757   BP: 98/64 104/68 (!) 119/57 (!) 107/55   BP Location: Left arm  Right arm Right arm   Patient Position: Sitting  Sitting Lying   Pulse:   94 82   Resp:   18 18   Temp:  98.2 °F (36.8 °C) 98 °F (36.7 °C) 97.6 °F (36.4 °C)   TempSrc:   Temporal Temporal   SpO2:       Weight:       Height:           Body mass index is 35.07 kg/m².          CONSTITUTIONAL  General Appearance: unremarkable, age appropriate, normal weight, well nourished, casually dressed     MUSCULOSKELETAL  Muscle Strength and Tone:no tremor, no tic  Abnormal Involuntary Movements: No  Gait and Station: non-ataxic     PSYCHIATRIC   Level of Consciousness: awake and alert   Orientation: person, place, time, and situation  Grooming: Casually dressed and Well groomed  Psychomotor Behavior: normal, cooperative, friendly and cooperative, eye contact normal  Speech: normal tone, normal rate, normal pitch, normal volume  Language: grossly intact  Mood: ok  Affect: Consistent with mood  Thought Process: linear, logical  Associations: intact   Thought Content: +passive SI and denies HI, no delusions  Perceptions: denies AH and denies  VH  Memory: Able to recall past events, Remote intact, and Recent intact  Attention:Attends to interview without distraction  Fund of Knowledge: Aware of current events and Vocabulary  appropriate   Estimate if Intelligence:  Average based on work/education history, vocabulary and mental status exam  Insight: has awareness of illness  Judgment: behavior is adequate to circumstances        DIAGNOSTIC TESTING   Laboratory Results  No results found for this or any previous visit (from the past 24 hour(s)).        MEDICAL DECISION MAKING      ASSESSMENT:   MDD, recurrent, severe without psychotic features   OCD  Unspecified anxiety disorder (panic attacks)     Psychosocial stressors      IUP- approximately 35 weeks     Elevated liver enzymes  ELevated blood glucose         PROBLEM LIST AND MANAGEMENT PLANS         Depression: pt counseled  -start trial of Zoloft at 25 mg po q day- increase to 50 mg po q day- increase to 75 mg po q day on 2/4 (goal dose is 100-150 mg daily)  -increase to 100 mg PO qd     OCD: pt counseled  -zoloft as above     Anxiety: pt counseled  -zoloft as above  -vistaril prn     Psychosocial stressors: pt counseled  -SW consulted to assist with resources       IUP- approximately 35 weeks: pt counseled  -checking fetal heart tones- WNL  -will consult Ob as/if needed     Elevated liver enzymes: pt counseled     ELevated blood glucose: pt counseled  -check HgA1c- 4.9           Discussed diagnosis, risks and benefits of proposed treatment vs alternative treatments vs no treatment, potential side effects of these treatments and the inherent unpredictability of treatment. The patient expresses understanding of the above and displays the capacity to agree with this treatment given said understanding. Patient also agrees that, currently, the benefits outweigh the risks and would like to pursue/continue treatment at this time.    Any medications being used off-label were discussed with the patient inclusive of the evidence base for the use of the medications and consent was obtained for the off-label use of the medication.       DISCHARGE PLANNING  Expected Disposition Plan: Home or Self  Care      NEED FOR CONTINUED HOSPITALIZATION  Psychiatric illness continues to pose a potential threat to life or bodily function, of self or others, thereby requiring the need for continued inpatient psychiatric hospitalization: Yes, due to: danger to self, gravely disabled, and suicidal ideation, as evidenced by:  Ongoing concerns with SI. and Ongoing concerns with grave disability with patient unable to perform basic feeding, hygiene and dressing activities without significant constant support.    Protective inpatient pyschiatric hospitalization required while a safe disposition plan is enacted: Yes    Patient stabilized and ready for discharge from inpatient psychiatric unit: No        STAFF:   Loi Gibbs III, MD  Psychiatry

## 2024-02-06 NOTE — PLAN OF CARE
"Patient quiet, restless, and withdrawn.  Denies intentions to harm self and/or others at this time. Denies auditory and/or visual hallucinations.  Affect congruent with anxious and depressed mood.  Patient's mind wandering with helpless, illogical thoughts relating to upcoming childbirth. Patient reports some frustration with spouse because he would want her home to begin to prepare the nursery and "it's not picture perfect like he would want it."  Interacts appropriately with peers and staff.  Accepts meals and medications.    CBT packet given.  "

## 2024-02-06 NOTE — PLAN OF CARE
Problem: Decreased Participation/Engagement (Depressive Signs/Symptoms)  Goal: Increased Participation and Engagement (Depressive Signs/Symptoms)  Outcome: Ongoing, Progressing    PROCESS GROUP:   Pt did not attend group today. PLPC met with pt individually.  PLPC attempted to discuss with pt on group discussion. Pt was resting in bed quietly. Pt states she was feeling tired at thsi time and will go to another group later. PLPC discussed with pt PLPC will come back at a later time and date to discuss group.

## 2024-02-06 NOTE — PLAN OF CARE
Pt is sleeping at this time and has slept 2 hours intermittently.  Walked hallways until after 1am.  Had received prn vistaril earlier in shift without results.  NAD.  Resp even & unlabored.  Pathways clear.  Q 15 minute safety checks ongoing.  All precautions maintained

## 2024-02-06 NOTE — PLAN OF CARE
Plan of care reviewed. Denies intent to harm self or others at this time. Denies hallucinations and delusions. Accepts snacks and medications. Gait steady, no falls. Patient is very tearful and expresses feelings of anxiety and depression, stating that she feels no josué in her baby's impending birth. She states her OCD is causing her to have so much anxiety and she does not know what to do. Interacting with staff and walking in halls. Crying intermittently. Called  and was observed to be crying over the phone.  Will continue precautions and monitor for safety.

## 2024-02-07 PROCEDURE — 11400000 HC PSYCH PRIVATE ROOM

## 2024-02-07 PROCEDURE — 99233 SBSQ HOSP IP/OBS HIGH 50: CPT | Mod: ,,, | Performed by: STUDENT IN AN ORGANIZED HEALTH CARE EDUCATION/TRAINING PROGRAM

## 2024-02-07 PROCEDURE — 90833 PSYTX W PT W E/M 30 MIN: CPT | Mod: ,,, | Performed by: STUDENT IN AN ORGANIZED HEALTH CARE EDUCATION/TRAINING PROGRAM

## 2024-02-07 PROCEDURE — 25000003 PHARM REV CODE 250: Performed by: STUDENT IN AN ORGANIZED HEALTH CARE EDUCATION/TRAINING PROGRAM

## 2024-02-07 PROCEDURE — 25000003 PHARM REV CODE 250: Performed by: PSYCHIATRY & NEUROLOGY

## 2024-02-07 RX ORDER — SERTRALINE HYDROCHLORIDE 25 MG/1
25 TABLET, FILM COATED ORAL ONCE
Status: COMPLETED | OUTPATIENT
Start: 2024-02-07 | End: 2024-02-07

## 2024-02-07 RX ADMIN — HYDROXYZINE PAMOATE 50 MG: 50 CAPSULE ORAL at 10:02

## 2024-02-07 RX ADMIN — SERTRALINE HYDROCHLORIDE 25 MG: 25 TABLET ORAL at 12:02

## 2024-02-07 RX ADMIN — SERTRALINE HYDROCHLORIDE 100 MG: 100 TABLET ORAL at 08:02

## 2024-02-07 RX ADMIN — PRENATAL VIT W/ FE FUMARATE-FA TAB 27-0.8 MG 1 TABLET: 27-0.8 TAB at 08:02

## 2024-02-07 NOTE — PROGRESS NOTES
[place] - [facility]  Adult Nutrition  Progress Note    SUMMARY     Recommendations  1. Continue regular diet with well spaced snacks.   2. Continue prenatal vitamins.  2. RD to follow.    Intervention  1. Collaboration with other providers    Goals: Pt will continue to consume at least 75% ENN by RD follow up.  Nutrition Goal Status: goal met  Communication of RD Recs: other (comment) (POC)    Assessment and Plan    No nutrition diagnosis at this time. Please re-consult if any acute nutrition concerns arise prior to RD follow up on 2/15/24.           Malnutrition Assessment       No NFPE -- PEC                                Reason for Assessment    Reason For Assessment: RD follow up  Diagnosis: psychological disorder  Relevant Medical History: MDD, OCD  Interdisciplinary Rounds: did not attend  General Information Comments:   1/31/24  Consult received for 29 yo F who is a new admit to [facility]. Presented to ED with obsessive thinking and depression with SI. Pt is 35 weeks pregnant. Consuming % of meals with snacks at this time. Meeting ENN. LBM unknown. No weight hx per chart review. Will continue to monitor for any nutrition related changes.    2/7/24  Patient continues meeting nutritional needs on regular diet with % meal consumption plus snacks. LBM unknown. GI system WDL. Will continue to monitor.     Nutrition Discharge Planning: Pt to dc on general healthful diet    Nutrition Risk Screen    Nutrition Risk Screen: no indicators present    Nutrition/Diet History    Food Allergies: NKFA  Factors Affecting Nutritional Intake: None identified at this time    Anthropometrics    Temp: 97.9 °F (36.6 °C)  Height: 5' (152.4 cm)  Height (inches): 60 in  Weight Method: Standard Scale  Weight: 81.3 kg (179 lb 2 oz)  Weight (lb): 179.13 lb  Ideal Body Weight (IBW), Female: 100 lb  % Ideal Body Weight, Female (lb): 175.49 %  BMI (Calculated): 35  BMI Grade: 30 - 34.9- obesity - grade I        Lab/Procedures/Meds    Pertinent Labs Reviewed: reviewed  Pertinent Labs Comments: cholesterol: 270 (H), T (H)  Lab Results   Component Value Date    HGBA1C 4.9 2024     No other pertinent labs within 72 hours (24)    Pertinent Medications Reviewed: reviewed  Pertinent Medications Comments: prenatal vitamin, Zoloft    PRN: acetaminophen, aluminum-magnesium hydroxide-simethicone, benzonatate, benztropine mesylate, hydrOXYzine pamoate, loperamide, nicotine, OLANZapine **AND** OLANZapine, ondansetron, promethazine    Physical Findings/Assessment         Estimated/Assessed Needs    Weight Used For Calorie Calculations: 80.4 kg (177 lb 4 oz)  Energy Calorie Requirements (kcal): 2351  Energy Need Method: [place] Jeor (MSJ x 1.3 AF + 452 kcals for 3rd trimester)  Protein Requirements: 88 g (1.1 g/kg for pregnancy)  Weight Used For Protein Calculations: 80.4 kg (177 lb 4 oz)  Fluid Requirements (mL): 1 mL/kcal  Estimated Fluid Requirement Method: RDA Method (or per MD)  RDA Method (mL): 2351         Nutrition Prescription Ordered    Current Diet Order: regular    Evaluation of Received Nutrient/Fluid Intake    I/O: N/A  Energy Calories Required: meeting needs  Protein Required: meeting needs  Fluid Required: meeting needs  Tolerance: tolerating  % Intake of Estimated Energy Needs: 75 - 100 %  % Meal Intake: 75 - 100 %    Nutrition Risk    Level of Risk/Frequency of Follow-up: low (1 x per week)       Monitor and Evaluation    Food and Nutrient Intake: energy intake, food and beverage intake  Food and Nutrient Adminstration: diet order  Knowledge/Beliefs/Attitudes: beliefs and attitudes, food and nutrition knowledge/skill  Physical Activity and Function: nutrition-related ADLs and IADLs, factors affecting access to physical activity  Anthropometric Measurements: height/length, weight, weight change, body mass index  Biochemical Data, Medical Tests and Procedures: electrolyte and renal panel,  gastrointestinal profile, glucose/endocrine profile, inflammatory profile, lipid profile       Nutrition Follow-Up    RD Follow-up?: Yes        Jocelin Verma RDN, TAYLAN

## 2024-02-07 NOTE — PROGRESS NOTES
"PSYCHIATRY DAILY INPATIENT PROGRESS NOTE  SUBSEQUENT HOSPITAL VISIT    ENCOUNTER DATE: 2/7/2024  SITE: [facility]    DATE OF ADMISSION: 1/29/2024  8:30 PM  LENGTH OF STAY: 9 days      CHIEF COMPLAINT   Ellen Rose is a 28 y.o. female, seen during daily rivera rounds on the inpatient unit.  Ellen Rose presented with the chief complaint of  obsessive thinking and depression with passive SI, "I needed to get back on medications."       The patient was seen and examined. The chart was reviewed.     Reviewed notes from Rns and labs from the last 24 hours.    The patient's case was discussed with the treatment team/care providers today including Rns    Staff reports no behavioral or management issues.     The patient has been compliant with treatment.        Subjective 02/07/2024     "I am feeling down today, I journaled last good and I'm practicing breathing," reports "I can't or I'm really struggling, not fixating, my thoughts going crazy, it's just constant... stuff that's happening in the moment, or previous, it bounces back and forth."    Discussed grounding/anchoring techniques which pt was agreeable to try, provided demonstration, education and examples.    She states she is willing to do an IOP after discharge.    -Obsessive thinking persists and remain distressing and impairing; no compulsions noted.     -Anxiety persists, and pt has concerns about her depression worsening after she has the baby.     -Depression persist with less intense/frequent passive SI. No changes since yesterday    She continues to note +daily fetal movements.      We discussed the r/b of tx vs alternative tx vs no treatment for her and her child; she feels that that the risks of no tx or greater than tx; she wants to continue pharmacotherapy as documented below.       The patient denies any side effects to medications.          Per RN:  Patient presents calm, cooperative, read, wrote and shared answers. Patient reports "a little " "better, but more down today" mood. Patient complains of having racing thoughts, fixated on things, "I feel like its taking over... I feel a little hopeful."           Psychiatric ROS (observed, reported, or endorsed/denied):  Depressed mood - Continuing  Interest/pleasure/anhedonia: variable  Guilt/hopelessness/worthlessness - variable  Changes in Sleep - denies  Changes in Appetite - variable  Changes in Concentration - variable  Changes in Energy - variable  PMA/R- No  Suicidal- active/passive ideations - fluctuating  Homicidal ideations: active/passive ideations - No    Hallucinations - No  Delusions - No  Disorganized behavior - No  Disorganized speech - No  Negative symptoms - No    Elevated mood - No  Decreased need for sleep - No  Grandiosity - No  Racing thoughts - No  Impulsivity - No  Irritability- No  Increased energy - No  Distractibility - No  Increase in goal-directed activity or PMA- No    Symptoms of ABRIL - denies  Symptoms of Panic Disorder- Continuing  Symptoms of PTSD - denies        Overall progress: Patient is showing mild improvement         Psychotherapy:  Target symptoms: depression, anxiety   Why chosen therapy is appropriate versus another modality: relevant to diagnosis  Outcome monitoring methods: self-report  Therapeutic intervention type: supportive psychotherapy  Topics discussed/themes: building skills sets for symptom management, symptom recognition  The patient's response to the intervention is accepting. The patient's progress toward treatment goals is fair.   Duration of intervention: 17 minutes.          Medical ROS  General ROS: negative  Ophthalmic ROS: negative  ENT ROS: negative  Allergy and Immunology ROS: negative  Hematological and Lymphatic ROS: negative  Endocrine ROS: negative  Respiratory ROS: no cough, shortness of breath, or wheezing  Cardiovascular ROS: no chest pain or dyspnea on exertion  Gastrointestinal ROS: no abdominal pain, change in bowel habits, or black or " bloody stools  Genito-Urinary ROS: no dysuria, trouble voiding, or hematuria  Musculoskeletal ROS: negative  Neurological ROS: no TIA or stroke symptoms  Dermatological ROS: negative      PAST MEDICAL HISTORY   Past Medical History:   Diagnosis Date    Major depressive disorder, single episode, unspecified     OCD (obsessive compulsive disorder)            PSYCHOTROPIC MEDICATIONS   Scheduled Meds:   prenatal vitamin  1 tablet Oral Daily    sertraline  100 mg Oral Daily     Continuous Infusions:  PRN Meds:.acetaminophen, aluminum-magnesium hydroxide-simethicone, benzonatate, benztropine mesylate, hydrOXYzine pamoate, loperamide, nicotine, OLANZapine **AND** OLANZapine, ondansetron, promethazine        EXAMINATION    VITALS   Vitals:    02/06/24 0757 02/06/24 1910 02/07/24 0746 02/07/24 0800   BP: (!) 107/55 (!) 108/59 (!) 99/53 107/62   BP Location: Right arm  Right arm Right arm   Patient Position: Lying  Sitting Sitting   Pulse: 82 92 74 85   Resp: 18 18 18 18   Temp: 97.6 °F (36.4 °C) 97.6 °F (36.4 °C) 97.9 °F (36.6 °C)    TempSrc: Temporal  Temporal    SpO2:       Weight:    81.3 kg (179 lb 2 oz)   Height:           Body mass index is 34.98 kg/m².        CONSTITUTIONAL  General Appearance: unremarkable, age appropriate, normal weight, well nourished, casually dressed     MUSCULOSKELETAL  Muscle Strength and Tone:no tremor, no tic  Abnormal Involuntary Movements: No  Gait and Station: non-ataxic     PSYCHIATRIC   Level of Consciousness: awake and alert   Orientation: person, place, time, and situation  Grooming: Casually dressed and Well groomed  Psychomotor Behavior: normal, cooperative, friendly and cooperative, eye contact normal  Speech: normal tone, normal rate, normal pitch, normal volume  Language: grossly intact  Mood: ok  Affect: Consistent with mood  Thought Process: linear, logical  Associations: intact   Thought Content: +passive SI and denies HI, no delusions  Perceptions: denies AH and denies   VH  Memory: Able to recall past events, Remote intact, and Recent intact  Attention:Attends to interview without distraction  Fund of Knowledge: Aware of current events and Vocabulary appropriate   Estimate if Intelligence:  Average based on work/education history, vocabulary and mental status exam  Insight: has awareness of illness  Judgment: behavior is adequate to circumstances        DIAGNOSTIC TESTING   Laboratory Results  No results found for this or any previous visit (from the past 24 hour(s)).        MEDICAL DECISION MAKING      ASSESSMENT:   MDD, recurrent, severe without psychotic features   OCD  Unspecified anxiety disorder (panic attacks)     Psychosocial stressors      IUP- approximately 35 weeks     Elevated liver enzymes  ELevated blood glucose         PROBLEM LIST AND MANAGEMENT PLANS           Depression: pt counseled  -start trial of Zoloft at 25 mg po q day- increase to 50 mg po q day- increase to 75 mg po q day on 2/4 (goal dose is 100-150 mg daily)  -increase to 100 mg PO qd   -increase to 125 mg PO qd and then 150 mg PO qd tomorrow    OCD: pt counseled  -zoloft as above     Anxiety: pt counseled  -zoloft as above  -vistaril prn     Psychosocial stressors: pt counseled  -SW consulted to assist with resources       IUP- approximately 35 weeks: pt counseled  -checking fetal heart tones- WNL  -will consult Ob as/if needed     Elevated liver enzymes: pt counseled     ELevated blood glucose: pt counseled  -check HgA1c- 4.9           Discussed diagnosis, risks and benefits of proposed treatment vs alternative treatments vs no treatment, potential side effects of these treatments and the inherent unpredictability of treatment. The patient expresses understanding of the above and displays the capacity to agree with this treatment given said understanding. Patient also agrees that, currently, the benefits outweigh the risks and would like to pursue/continue treatment at this time.    Any medications  being used off-label were discussed with the patient inclusive of the evidence base for the use of the medications and consent was obtained for the off-label use of the medication.       DISCHARGE PLANNING  Expected Disposition Plan: Home or Self Care      NEED FOR CONTINUED HOSPITALIZATION  Psychiatric illness continues to pose a potential threat to life or bodily function, of self or others, thereby requiring the need for continued inpatient psychiatric hospitalization: Yes, due to: danger to self, gravely disabled, and suicidal ideation, as evidenced by:  Ongoing concerns with SI. and Ongoing concerns with grave disability with patient unable to perform basic feeding, hygiene and dressing activities without significant constant support.    Protective inpatient pyschiatric hospitalization required while a safe disposition plan is enacted: Yes    Patient stabilized and ready for discharge from inpatient psychiatric unit: No        STAFF:   Loi Gibbs III, MD  Psychiatry

## 2024-02-07 NOTE — PLAN OF CARE
Lying quietly in bed, eyes closed, respirations even, unlabored. Apparently asleep. Slept 3 hours thus far without interruption. Safety precautions maintained. Rounds done every 15 minutes. Bed is fixed in low position and room is uncluttered and pathways are clear.

## 2024-02-07 NOTE — PLAN OF CARE
Pt calm and cooperative, pt states continues with OCD thoughts and tearful at times about not being excited about the baby coming, denies SI at this time, appetite good, safety precautions maintained, will continue to monitor

## 2024-02-07 NOTE — PLAN OF CARE
Recommendations  1. Continue regular diet with well spaced snacks.   2. Continue prenatal vitamins.  2. RD to follow.    Intervention  1. Collaboration with other providers    Goals: Pt will continue to consume at least 75% ENN by RD follow up.  Nutrition Goal Status: goal met

## 2024-02-07 NOTE — PROGRESS NOTES
"   02/06/24 1430   [facility] Group Therapy   Group Name Leisure Education   Specific Interventions Coping Skills Training   Participation Level Appropriate;Sharing;Attentive   Participation Quality Cooperative   Insight/Motivation Applies New Skills;Good   Affect/Mood Display Appropriate   Cognition Alert   Psychomotor WNL     Patient presents calm, cooperative ,reports "a little more elevated, but a little better" mood, "I'm still struggling, but woke up feeling lighter." Patient list benefits to assist in choosing meaningful and satisfying activities and accepted a list of "Alphabet of Stress Management and Coping skills" to help improve coping skills.  "

## 2024-02-07 NOTE — MEDICAL/APP STUDENT
"PSYCHIATRY DAILY INPATIENT PROGRESS NOTE  SUBSEQUENT HOSPITAL VISIT    ENCOUNTER DATE: 2/7/2024  SITE: [facility]    DATE OF ADMISSION: 1/29/2024  8:30 PM  LENGTH OF STAY: 9 days      CHIEF COMPLAINT   Ellen Rose is a 28 y.o. female, seen during daily rivera rounds on the inpatient unit.  Ellen Rose presented with the chief complaint of depression and anxiety      The patient was seen and examined. The chart was reviewed.     Reviewed notes from {staffnotes:35701} and labs from the last 24 hours.    The patient's case was discussed with the treatment team/care providers today including {staffnotes:83426}    Staff reports no behavioral or management issues.     The patient has been compliant with treatment.      Subjective 02/07/2024       Today the patient reports feeling more down and "heavy" compared to yesterday.      The patient denies any side effects to medications.        Interim/overnight events per report/notes:    No events, didn't sleep as well as previous nights      Psychiatric ROS (observed, reported, or endorsed/denied):  Depressed mood - Continuing  Interest/pleasure/anhedonia: improving minimally  Guilt/hopelessness/worthlessness - improving minimally  Changes in Sleep -  slept "ok, but not as well as previous nights"  Changes in Appetite - No  Changes in Concentration - No  Changes in Energy - No  PMA/R- No  Suicidal- active/passive ideations -  sometimes wishes she could "go to sleep and not wake up"  Homicidal ideations: active/passive ideations - No    Hallucinations - No  Delusions - No  Disorganized behavior - No  Disorganized speech - No  Negative symptoms - No    Elevated mood - No  Decreased need for sleep - No  Grandiosity - No  Racing thoughts - Continuing  Impulsivity - No  Irritability- No  Increased energy - No  Distractibility - Continuing  Increase in goal-directed activity or PMA- No    Symptoms of ABRIL - No  Symptoms of Panic Disorder- No  Symptoms of PTSD - " No        Overall progress: Patient is showing mild improvement         Psychotherapy:  Target symptoms: depression, distractability, lack of focus  Why chosen therapy is appropriate versus another modality: {reason:70911}  Outcome monitoring methods: {methods:77066}  Therapeutic intervention type: {types:24864}  Topics discussed/themes:  The arrival of her baby; feeling guilty that she isn't excited about it  The patient's response to the intervention is {PSY INTERVENTION RESPONSE:16806}. The patient's progress toward treatment goals is {Progress:20262}.   Duration of intervention: *** minutes.        Medical ROS  ROS      PAST MEDICAL HISTORY   Past Medical History:   Diagnosis Date    Major depressive disorder, single episode, unspecified     OCD (obsessive compulsive disorder)            PSYCHOTROPIC MEDICATIONS   Scheduled Meds:   prenatal vitamin  1 tablet Oral Daily    sertraline  100 mg Oral Daily     Continuous Infusions:  PRN Meds:.acetaminophen, aluminum-magnesium hydroxide-simethicone, benzonatate, benztropine mesylate, hydrOXYzine pamoate, loperamide, nicotine, OLANZapine **AND** OLANZapine, ondansetron, promethazine        EXAMINATION    VITALS   Vitals:    02/06/24 0757 02/06/24 1910 02/07/24 0746 02/07/24 0800   BP: (!) 107/55 (!) 108/59 (!) 99/53 107/62   BP Location: Right arm  Right arm Right arm   Patient Position: Lying  Sitting Sitting   Pulse: 82 92 74 85   Resp: 18 18 18 18   Temp: 97.6 °F (36.4 °C) 97.6 °F (36.4 °C) 97.9 °F (36.6 °C)    TempSrc: Temporal  Temporal    SpO2:       Weight:    81.3 kg (179 lb 2 oz)   Height:           Body mass index is 34.98 kg/m².        CONSTITUTIONAL  General Appearance: unremarkable, age appropriate    MUSCULOSKELETAL  Muscle Strength and Tone:no tremor, no tic  Abnormal Involuntary Movements: No  Gait and Station: non-ataxic, not teste - laying in bed    PSYCHIATRIC   Level of Consciousness: awake and alert   Orientation: person, place, time, and  "situation  Grooming: Hospital garb and Well groomed  Psychomotor Behavior: friendly and cooperative  Speech: normal tone, normal rate, normal pitch, normal volume  Language: grossly intact  Mood: anxious and depressed  Affect: Consistent with mood  Thought Process: linear, logical  Associations: intact   Thought Content: +SI "wish I could take my pills at night and not wake up"  Perceptions: not RIS  Memory: Registers and recalls 3/3 objects at 0 and 5 minutes, Able to recall past events, Remote intact, and Recent intact  Attention:Attends to interview without distraction - reports being distracted easily  Fund of Knowledge: Aware of current events and Vocabulary appropriate   Estimate if Intelligence:  Average based on work/education history, vocabulary and mental status exam  Insight: has awareness of illness  Judgment: behavior is adequate to circumstances        DIAGNOSTIC TESTING   Laboratory Results  No results found for this or any previous visit (from the past 24 hour(s)).          MEDICAL DECISION MAKING      ASSESSMENT:   Still feeling depressed and anxious with some OCD symptoms         PROBLEM LIST AND MANAGEMENT PLANS    Continue management and encourage journaling             Discussed diagnosis, risks and benefits of proposed treatment vs alternative treatments vs no treatment, potential side effects of these treatments and the inherent unpredictability of treatment. The patient expresses understanding of the above and displays the capacity to agree with this treatment given said understanding. Patient also agrees that, currently, the benefits outweigh the risks and would like to pursue/continue treatment at this time.    Any medications being used off-label were discussed with the patient inclusive of the evidence base for the use of the medications and consent was obtained for the off-label use of the medication.       DISCHARGE PLANNING  Expected Disposition Plan: {Select Anticipated Discharge " Plan:44087}      NEED FOR CONTINUED HOSPITALIZATION  Psychiatric illness continues to pose a potential threat to life or bodily function, of self or others, thereby requiring the need for continued inpatient psychiatric hospitalization: Yes, due to: {PSY IP JUSTIFICATION FOR CONTINUED ACUTE CARE HOSPITALIZATION:14529}, as evidenced by:  {just:64916}    Protective inpatient pyschiatric hospitalization required while a safe disposition plan is enacted: Yes    Patient stabilized and ready for discharge from inpatient psychiatric unit: No        STAFF:   Naveen Keenan MD  Psychiatry

## 2024-02-07 NOTE — PLAN OF CARE
"Patient blunted affect with somber mood. We discussed Coping Skills and Journaling her thoughts and emotions. We discussed to journal two positive thoughts of each day. We discussed her fears and anxiety along with healthy coping skills. She reported she is nervous about having the baby and not knowing what to expect concerning labor. We discussed these concerns. Patient reports " I do feel a little better today. I spoke to my mother, sister and  and that made me feel better. I just wish the medicine would work faster. I am trying to think positive". Educated patient on medications and encouraged this patient to practice coping skills and CBT.  Patient reports on a scale of 1-10 with ten being the highest level of depression. Patient rating depression today a 2 or 3/10. On the same scale patient rating anxiety a 8/10. Encouraged patient to attend groups and activities. Patient denies SI/HI no self harming behavior displayed, no aggressive behavior displayed towards others. Patient contracted safety with staff and unit. Discussed healthy coping skills and techniques. Educated, reviewed  and discussed plan of care and medication regiment with this patient. Discussed and educated with this patient any concerns that needed to be addressed along with the importance of medication compliance  1:1 with this patient. Patient voices understanding of all teachings.   "

## 2024-02-07 NOTE — PROGRESS NOTES
"   02/07/24 1010   [facility] Group Therapy   Group Name Therapeutic Recreation   Specific Interventions Cognitive Stimulation Training   Participation Level Appropriate;Attentive;Sharing   Participation Quality Cooperative;Social   Insight/Motivation Good;Applies New Skills   Affect/Mood Display Appropriate   Cognition Alert   Psychomotor WNL     Patient presents calm, cooperative, read, wrote and shared answers. Patient reports "a little better, but more down today" mood. Patient complains of having racing thoughts, fixated on things, "I feel like its taking over... I feel a little hopeful." Patient verbalized that she journaled last night, "it let me get my thoughts out. I know some thoughts are not worth sitting on."  "

## 2024-02-07 NOTE — PROGRESS NOTES
"   02/07/24 1621   [facility] Group Therapy   Group Name Other  (1:1 attempt)   Participation Level None   Participation Quality Sleeping     Patient did not participate due to resting in bed, half sleep, "I don't feel up to it."  "

## 2024-02-08 PROCEDURE — 25000003 PHARM REV CODE 250: Performed by: PSYCHIATRY & NEUROLOGY

## 2024-02-08 PROCEDURE — 11400000 HC PSYCH PRIVATE ROOM

## 2024-02-08 PROCEDURE — 90833 PSYTX W PT W E/M 30 MIN: CPT | Mod: ,,, | Performed by: PSYCHIATRY & NEUROLOGY

## 2024-02-08 PROCEDURE — 25000003 PHARM REV CODE 250: Performed by: STUDENT IN AN ORGANIZED HEALTH CARE EDUCATION/TRAINING PROGRAM

## 2024-02-08 PROCEDURE — 99233 SBSQ HOSP IP/OBS HIGH 50: CPT | Mod: ,,, | Performed by: PSYCHIATRY & NEUROLOGY

## 2024-02-08 RX ADMIN — HYDROXYZINE PAMOATE 50 MG: 50 CAPSULE ORAL at 10:02

## 2024-02-08 RX ADMIN — SERTRALINE HYDROCHLORIDE 150 MG: 100 TABLET ORAL at 08:02

## 2024-02-08 RX ADMIN — PRENATAL VIT W/ FE FUMARATE-FA TAB 27-0.8 MG 1 TABLET: 27-0.8 TAB at 08:02

## 2024-02-08 NOTE — PLAN OF CARE
Problem: Adult Behavioral Health Plan of Care  Goal: Optimized Coping Skills in Response to Life Stressors  Outcome: Ongoing, Progressing  Intervention: Promote Effective Coping Strategies  Flowsheets (Taken 2/8/2024 6580)  Supportive Measures:   active listening utilized   counseling provided   self-reflection promoted   verbalization of feelings encouraged   Process Group        Behavior:  Pt attended group dressed in personal clothing. Pt attentive and engaged.      Intervention:     Process discussion on symptoms of indication of processing thoughts with handling conflict and resolution. Patients were encouraged to identify ways to cope with and reflect on what are ways they could resolve conflict and what coping skills did they learn while in the hospital?        Response:  Pt affect appropriate with euthymic mood. Pt states she has to implement the ways she handles conflict between her and her  and will have to look at the conflict in a different view and to come to an agreement with her  about why is there a conflict and to come to an agreement about the situation.     Plan:  Staff will continue to assess and obtain collaterals as needed.  Staff will coordinate discharge to home with residential treatment when deemed stable.

## 2024-02-08 NOTE — PSYCH
An appointment has been scheduled on 2/13/2024 at 9:30 am for aftercare at [facility]  Though this is the Mardi [place] Holiday their office will be open.

## 2024-02-08 NOTE — PROGRESS NOTES
"PSYCHIATRY DAILY INPATIENT PROGRESS NOTE  SUBSEQUENT HOSPITAL VISIT    ENCOUNTER DATE: 2/8/2024  SITE: [facility]    DATE OF ADMISSION: 1/29/2024  8:30 PM  LENGTH OF STAY: 10 days      CHIEF COMPLAINT   Ellen Rose is a 28 y.o. female, seen during daily rivera rounds on the inpatient unit.  Ellen Rose presented with the chief complaint of  obsessive thinking and depression with passive SI, "I needed to get back on medications."       The patient was seen and examined. The chart was reviewed.     Reviewed notes from Rns, MD, CTRS, and RD and labs from the last 24 hours.    The patient's case was discussed with the treatment team/care providers today including Rns    Staff reports no behavioral or management issues.     The patient has been compliant with treatment.        Subjective 02/08/2024     "I feel so-so." rShe reports conitnued but somewhat improving symptoms as documented below.   Obsessive thoughts persist. SI had decreased- she denied any intentions of hurting herself. HSe feels more hoepful.     Discussed coping skills. Patient is able to identify positive coping skills and social support/     She states she is willing to do an IOP after discharge.    -Obsessive thinking persists and remain distressing and impairing at times; no compulsions noted.     -Anxiety persists, and pt has concerns about her depression worsening after she has the baby.     -Depression persist with less intense/frequent passive SI. No changes since yesterday- some improvement since admission    She continues to note +daily fetal movements.      We discussed the r/b of tx vs alternative tx vs no treatment for her and her child; she feels that that the risks of no tx or greater than tx; she wants to continue pharmacotherapy as documented below.       The patient denies any side effects to medications.              Psychiatric ROS (observed, reported, or endorsed/denied):  Depressed mood - decreasing " slowly  Interest/pleasure/anhedonia: decreasing slowly  Guilt/hopelessness/worthlessness - decreasing slowly  Changes in Sleep - denies  Changes in Appetite - decreasing slowly  Changes in Concentration - decreasing slowly  Changes in Energy - decreasing slowly  PMA/R- No  Suicidal- active/passive ideations - improving steadily  Homicidal ideations: active/passive ideations - No    Hallucinations - No  Delusions - No  Disorganized behavior - No  Disorganized speech - No  Negative symptoms - No    Elevated mood - No  Decreased need for sleep - No  Grandiosity - No  Racing thoughts - No  Impulsivity - No  Irritability- No  Increased energy - No  Distractibility - No  Increase in goal-directed activity or PMA- No    Symptoms of ABRIL - denies  Symptoms of Panic Disorder- decreasing steadily  Symptoms of PTSD - denies        Overall progress: Patient is showing mild improvement         Psychotherapy:  Target symptoms: depression, anxiety   Why chosen therapy is appropriate versus another modality: relevant to diagnosis  Outcome monitoring methods: self-report, observation  Therapeutic intervention type: insight oriented psychotherapy, behavior modifying psychotherapy, supportive psychotherapy  Topics discussed/themes: building skills sets for symptom management, symptom recognition  The patient's response to the intervention is accepting. The patient's progress toward treatment goals is fair.   Duration of intervention: 16 minutes.          Medical ROS  General ROS: negative  Ophthalmic ROS: negative  ENT ROS: negative  Allergy and Immunology ROS: negative  Hematological and Lymphatic ROS: negative  Endocrine ROS: negative  Respiratory ROS: no cough, shortness of breath, or wheezing  Cardiovascular ROS: no chest pain or dyspnea on exertion  Gastrointestinal ROS: no abdominal pain, change in bowel habits, or black or bloody stools  Genito-Urinary ROS: no dysuria, trouble voiding, or hematuria  Musculoskeletal ROS:  "negative  Neurological ROS: no TIA or stroke symptoms  Dermatological ROS: negative      PAST MEDICAL HISTORY   Past Medical History:   Diagnosis Date    Major depressive disorder, single episode, unspecified     OCD (obsessive compulsive disorder)            PSYCHOTROPIC MEDICATIONS   Scheduled Meds:   prenatal vitamin  1 tablet Oral Daily    sertraline  150 mg Oral Daily     Continuous Infusions:  PRN Meds:.acetaminophen, aluminum-magnesium hydroxide-simethicone, benzonatate, benztropine mesylate, hydrOXYzine pamoate, loperamide, nicotine, OLANZapine **AND** OLANZapine, ondansetron, promethazine        EXAMINATION    VITALS   Vitals:    02/07/24 0746 02/07/24 0800 02/07/24 1945 02/08/24 0800   BP: (!) 99/53 107/62 (!) 115/59 (!) 102/50   BP Location: Right arm Right arm Left arm Right arm   Patient Position: Sitting Sitting Sitting Sitting   Pulse: 74 85 97 80   Resp: 18 18 20 17   Temp: 97.9 °F (36.6 °C)  97.6 °F (36.4 °C) 97 °F (36.1 °C)   TempSrc: Temporal   Temporal   SpO2:       Weight:  81.3 kg (179 lb 2 oz)     Height:           Body mass index is 34.98 kg/m².        CONSTITUTIONAL  General Appearance: unremarkable, age appropriate, normal weight, well nourished, casually dressed     MUSCULOSKELETAL  Muscle Strength and Tone:no tremor, no tic  Abnormal Involuntary Movements: No  Gait and Station: non-ataxic     PSYCHIATRIC   Level of Consciousness: awake and alert   Orientation: person, place, time, and situation  Grooming: Casually dressed and Well groomed  Psychomotor Behavior: normal, cooperative, friendly and cooperative, eye contact normal  Speech: normal tone, normal rate, normal pitch, normal volume  Language: grossly intact  Mood: "so-so"  Affect: Consistent with mood  Thought Process: linear, logical  Associations: intact   Thought Content: less passive SI and denies HI, no delusions  Perceptions: denies AH and denies  VH  Memory: Able to recall past events, Remote intact, and Recent " intact  Attention:Attends to interview without distraction  Fund of Knowledge: Aware of current events and Vocabulary appropriate   Estimate if Intelligence:  Average based on work/education history, vocabulary and mental status exam  Insight: has awareness of illness  Judgment: behavior is adequate to circumstances        DIAGNOSTIC TESTING   Laboratory Results  No results found for this or any previous visit (from the past 24 hour(s)).        MEDICAL DECISION MAKING      ASSESSMENT:   MDD, recurrent, severe without psychotic features   OCD  Unspecified anxiety disorder (panic attacks)     Psychosocial stressors      IUP- approximately 35 weeks     Elevated liver enzymes  ELevated blood glucose         PROBLEM LIST AND MANAGEMENT PLANS           Depression: pt counseled  -start trial of Zoloft at 25 mg po q day- increase to 50 mg po q day- increase to 75 mg po q day on 2/4 (goal dose is 100-150 mg daily)  -increase to 100 mg PO qd   -increase to 125 mg PO qd- increase to 150 mg PO qd today    OCD: pt counseled  -zoloft as above     Anxiety: pt counseled  -zoloft as above  -vistaril prn     Psychosocial stressors: pt counseled  -SW consulted to assist with resources       IUP- approximately 35 weeks: pt counseled  -checking fetal heart tones- WNL  -will consult Ob as/if needed     Elevated liver enzymes: pt counseled     ELevated blood glucose: pt counseled  -check HgA1c- 4.9           Discussed diagnosis, risks and benefits of proposed treatment vs alternative treatments vs no treatment, potential side effects of these treatments and the inherent unpredictability of treatment. The patient expresses understanding of the above and displays the capacity to agree with this treatment given said understanding. Patient also agrees that, currently, the benefits outweigh the risks and would like to pursue/continue treatment at this time.    Any medications being used off-label were discussed with the patient inclusive of  the evidence base for the use of the medications and consent was obtained for the off-label use of the medication.       DISCHARGE PLANNING  Expected Disposition Plan: Home or Self Care- consider discharge tomorrow if further improved and adequate outpatient tx obtained       NEED FOR CONTINUED HOSPITALIZATION  Psychiatric illness continues to pose a potential threat to life or bodily function, of self or others, thereby requiring the need for continued inpatient psychiatric hospitalization: Yes, due to: danger to self, gravely disabled, and suicidal ideation, as evidenced by:  Concerns with SI--Fading. and Ongoing concerns with grave disability with patient unable to perform basic feeding, hygiene and dressing activities without significant constant support.    Protective inpatient pyschiatric hospitalization required while a safe disposition plan is enacted: Yes    Patient stabilized and ready for discharge from inpatient psychiatric unit: No        STAFF:   Ray Sumner MD  Psychiatry

## 2024-02-08 NOTE — NURSING
Pt sleeping at this time, slept 3.5 hrs with no awakenings.Pt has a hard time falling asleep, has racing thoughts, and worries. NAD. Resp even and unlabored.Pathways clear,bed in low position. Q 15 min safety check ongoing.All precautions maintained.

## 2024-02-08 NOTE — PLAN OF CARE
Said she wouldn't hurt herself but sometimes wishes she would not wake up.  Verbally contracted for safety.  Briefly tearful.  Ate snack.  Reports insomnia even with the prn hydroxyzine.  Goals discussed.  Actively listened.  Support given.  Stressed compliance with prescribed meds upon discharge.

## 2024-02-08 NOTE — PLAN OF CARE
"Patient has blunted affect and sober mood. Patient reports "I guess when I leave here and get involved with an IOP things hopefully get better", Patient journaling her thoughts and attending groups and meeting. Patient calm and cooperative, interacting with peers and staff. Patient reports she feels the baby moving all the time and denies and cramping or distress. Patient denies SI/HI no self harming behavior displayed, no aggressive behavior displayed towards others. Patient contracted safety with staff and unit. Discussed healthy coping skills and techniques. Educated, reviewed  and discussed plan of care and medication regiment with this patient. Discussed and educated with this patient any concerns that needed to be addressed along with the importance of medication compliance  1:1 with this patient. Patient voices understanding of all teachings.   "

## 2024-02-08 NOTE — MEDICAL/APP STUDENT
"PSYCHIATRY DAILY INPATIENT PROGRESS NOTE  SUBSEQUENT HOSPITAL VISIT    ENCOUNTER DATE: 2/8/2024  [facility]    DATE OF ADMISSION: 1/29/2024  8:30 PM  LENGTH OF STAY: 10 days      CHIEF COMPLAINT   Ellen Rose is a 28 y.o. female, seen during daily rivera rounds on the inpatient unit.  Ellen Rose presented with the chief complaint of depression and anxiety      The patient was seen and examined. The chart was reviewed.     Reviewed notes from {staffnotes:42481} and labs from the last 24 hours.    The patient's case was discussed with the treatment team/care providers today including {staffnotes:50106}    Staff reports no behavioral or management issues.     The patient has been compliant with treatment.      Subjective 02/08/2024       Today the patient reports feeling slightly better and "lighter" than yesterday, still frustrated with lack of progress, anxious about going home. Reported meditation techniques are helping.      The patient denies any side effects to medications.        Interim/overnight events per report/notes:          Psychiatric ROS (observed, reported, or endorsed/denied):  Depressed mood - improving minimally  Interest/pleasure/anhedonia: Continuing  Guilt/hopelessness/worthlessness - Continuing  Changes in Sleep - No  Changes in Appetite - No  Changes in Concentration - No  Changes in Energy - No  PMA/R- No  Suicidal- active/passive ideations - No  Homicidal ideations: active/passive ideations - No    Hallucinations - No  Delusions - No  Disorganized behavior - No  Disorganized speech - No  Negative symptoms - No    Elevated mood - No  Decreased need for sleep - No  Grandiosity - No  Racing thoughts - No  Impulsivity - No  Irritability- No  Increased energy - No  Distractibility - No  Increase in goal-directed activity or PMA- No    Symptoms of ABRIL - No  Symptoms of Panic Disorder- No  Symptoms of PTSD - No        Overall progress: Patient is showing mild improvement "               Medical ROS  ROS      PAST MEDICAL HISTORY   Past Medical History:   Diagnosis Date    Major depressive disorder, single episode, unspecified     OCD (obsessive compulsive disorder)            PSYCHOTROPIC MEDICATIONS   Scheduled Meds:   prenatal vitamin  1 tablet Oral Daily    sertraline  150 mg Oral Daily     Continuous Infusions:  PRN Meds:.acetaminophen, aluminum-magnesium hydroxide-simethicone, benzonatate, benztropine mesylate, hydrOXYzine pamoate, loperamide, nicotine, OLANZapine **AND** OLANZapine, ondansetron, promethazine        EXAMINATION    VITALS   Vitals:    02/07/24 0746 02/07/24 0800 02/07/24 1945 02/08/24 0800   BP: (!) 99/53 107/62 (!) 115/59 (!) 102/50   BP Location: Right arm Right arm Left arm Right arm   Patient Position: Sitting Sitting Sitting Sitting   Pulse: 74 85 97 80   Resp: 18 18 20 17   Temp: 97.9 °F (36.6 °C)  97.6 °F (36.4 °C) 97 °F (36.1 °C)   TempSrc: Temporal   Temporal   SpO2:       Weight:  81.3 kg (179 lb 2 oz)     Height:           Body mass index is 34.98 kg/m².        CONSTITUTIONAL  General Appearance: unremarkable, age appropriate    MUSCULOSKELETAL  Muscle Strength and Tone:no tremor, no tic  Abnormal Involuntary Movements: No  Gait and Station: non-ataxic    PSYCHIATRIC   Level of Consciousness: awake and alert   Orientation: person, place, and situation  Grooming: Hospital garb and Well groomed  Psychomotor Behavior: friendly and cooperative  Speech: normal tone, normal rate, normal pitch, normal volume  Language: grossly intact  Mood: depressed  Affect: Consistent with mood  Thought Process: linear, logical  Associations: intact   Thought Content: denies SI and no delusions  Perceptions: not RIS  Memory: Able to recall past events, Remote intact, and Recent intact  Attention:Attends to interview without distraction  Fund of Knowledge: Aware of current events and Vocabulary appropriate   Estimate if Intelligence:  Average based on work/education history,  vocabulary and mental status exam  Insight: has awareness of illness  Judgment: behavior is adequate to circumstances        DIAGNOSTIC TESTING   Laboratory Results  No results found for this or any previous visit (from the past 24 hour(s)).          MEDICAL DECISION MAKING      ASSESSMENT:   She is showing slow progress        PROBLEM LIST AND MANAGEMENT PLANS    Continue            Discussed diagnosis, risks and benefits of proposed treatment vs alternative treatments vs no treatment, potential side effects of these treatments and the inherent unpredictability of treatment. The patient expresses understanding of the above and displays the capacity to agree with this treatment given said understanding. Patient also agrees that, currently, the benefits outweigh the risks and would like to pursue/continue treatment at this time.    Any medications being used off-label were discussed with the patient inclusive of the evidence base for the use of the medications and consent was obtained for the off-label use of the medication.       DISCHARGE PLANNING  Expected Disposition Plan: {Select Anticipated Discharge Plan:12987}      NEED FOR CONTINUED HOSPITALIZATION  Psychiatric illness continues to pose a potential threat to life or bodily function, of self or others, thereby requiring the need for continued inpatient psychiatric hospitalization: Yes, due to: {PSY IP JUSTIFICATION FOR CONTINUED ACUTE CARE HOSPITALIZATION:38910}, as evidenced by:  {just:96642}    Protective inpatient pyschiatric hospitalization required while a safe disposition plan is enacted: Yes    Patient stabilized and ready for discharge from inpatient psychiatric unit: No        STAFF:   Naveen Keenan MD  Psychiatry

## 2024-02-08 NOTE — PROGRESS NOTES
"   02/08/24 1000   [facility] Group Therapy   Group Name Other  (1:1 attempt)   Participation Level None   Participation Quality Sleeping     Patient presents resting in bed, under the covers, half sleep, reports a "so-so" mood and did not feel like participating in group today, "maybe this afternoon."  "

## 2024-02-09 VITALS
WEIGHT: 179.13 LBS | BODY MASS INDEX: 35.17 KG/M2 | HEART RATE: 79 BPM | TEMPERATURE: 97 F | OXYGEN SATURATION: 96 % | HEIGHT: 60 IN | SYSTOLIC BLOOD PRESSURE: 104 MMHG | RESPIRATION RATE: 18 BRPM | DIASTOLIC BLOOD PRESSURE: 64 MMHG

## 2024-02-09 PROCEDURE — 99239 HOSP IP/OBS DSCHRG MGMT >30: CPT | Mod: ,,, | Performed by: STUDENT IN AN ORGANIZED HEALTH CARE EDUCATION/TRAINING PROGRAM

## 2024-02-09 PROCEDURE — 25000003 PHARM REV CODE 250: Performed by: STUDENT IN AN ORGANIZED HEALTH CARE EDUCATION/TRAINING PROGRAM

## 2024-02-09 PROCEDURE — 25000003 PHARM REV CODE 250: Performed by: PSYCHIATRY & NEUROLOGY

## 2024-02-09 RX ORDER — SERTRALINE HYDROCHLORIDE 100 MG/1
150 TABLET, FILM COATED ORAL DAILY
Qty: 45 TABLET | Refills: 0 | Status: SHIPPED | OUTPATIENT
Start: 2024-02-10 | End: 2025-02-09

## 2024-02-09 RX ADMIN — SERTRALINE HYDROCHLORIDE 150 MG: 100 TABLET ORAL at 08:02

## 2024-02-09 RX ADMIN — PRENATAL VIT W/ FE FUMARATE-FA TAB 27-0.8 MG 1 TABLET: 27-0.8 TAB at 08:02

## 2024-02-09 NOTE — PSYCH
Pt will be following up with [facility] [place]  [address] [place] [address]  Phone [phone]. Appointment is on Feb. 15,2024 @ 9:30 pm. Pt must arrive 20 minutes prior to appointment to allow time for intake.   Pt will also be following up with [facility], [facility] [address] [place] [address] [phone] ph [phone] fx  as scheduled on 2/13/2024@ 9:30 am for out pt psych services. Pt will also follow [facility] [address] [address] [place] [address] Phone [phone].   Pt does not use tobacco products and had a negative UDS on admit. AVS faxed on 02/09/2024 @ 12:37 pm.

## 2024-02-09 NOTE — DISCHARGE SUMMARY
"Discharge Summary  Psychiatry    Admit Date: 2024    Discharge Date and Time:  2024 10:02 AM    Attending Physician: Ray Sumner MD     Discharge Provider: Loi Gibbs III    Reason for Admission:  HISTORY    CHIEF COMPLAINT   Ellen Rose is a 28 y.o. female with a past psychiatric history of OCD and depression currently admitted to the inpatient unit with the following chief complaint: obsessive thinking and depression with passive SI, "I needed to get back on medications."    HPI   The patient was seen and examined. The chart was reviewed.     The patient presented to the ER on 2024 . Per staff notes:  -Patient is 35 weeks pregnant, comes to ER today states she feels like she needs additional meds for her OCD and depression, states she does have a feeling of haplessness and would not mind never waking up again   -28-year-old female  at 35 weeks presenting with depression and worsening OCD tendencies.  Patient reports that she was taken off her psychiatric medications prior to her pregnancy, but in the last few weeks has been feeling more depressed.  She states that she feels resentment towards her unborn child, and has a feeling of not wanting to wake up in the morning.  Patient denies any abdominal pain, vaginal bleeding, vaginal discharge.  No other complaints.   -Patient reports to [facility] for 7/10 depression and 5/10 anxiety. Reports that as a child she had OCD tendencies. However, as an adult she has been more depressed due to trying to control her OCD symptoms. She reports that she ruminates on conversations that happened years ago and she could not handle her symptoms. When she got pregnant, she had to get off of her medications (fluvoxamine, hydroxyzine, bupropion, and Ambien). These medications were substituted with fluoxetine. She has been to the ED twice in her community for psych placement, but states that nobody can accept her due to being pregnant. They placed her on " "hydroxyzine PRN and Unisom each evening, but they have not worked well for her. Upon getting closer to her delivery date, she is becoming anxious and "numb" to giving birth. Reports that she wishes she could go to sleep and not wake up, but does not have any plans to harm herself. States that she sometimes has resentment towards her baby because "now I can't do anything even if I wanted to". She has a history of being on a BHU for her OCD and depression. Denies HI. Denies hallucinations. Reports decreased sleep and appetite. UDS negative. No alcohol use or smoking.      The patient was medically cleared and admitted to the U.     The patient reports long standing issues with OCD and periodic depression. She was doing well until she got off of her medications secondly to pregnancy (currently at about 35 weeks; first pregnancy) . Symptoms recurred over the last month or so and quickly progressed. She developed passive SI and uncontrolled obsessive thinking.      She plans to try breastfeeding.     Procedures Performed: * No surgery found *    Hospital Course:    Patient was admitted to the inpatient psychiatry unit after being medically cleared in the ED. Chart and labs were reviewed. The patient was stabilized as follows:      Depression: pt counseled  -start trial of Zoloft at 25 mg po q day- increase to 50 mg po q day- increase to 75 mg po q day on 2/4 (goal dose is 100-150 mg daily)  -increase to 100 mg PO qd   -increase to 125 mg PO qd- increase to 150 mg PO qd today    OCD: pt counseled  -zoloft as above     Anxiety: pt counseled  -zoloft as above  -vistaril prn     Psychosocial stressors: pt counseled  -SW consulted to assist with resources       IUP- approximately 35 weeks: pt counseled  -checking fetal heart tones- WNL  -will consult Ob as/if needed     Elevated liver enzymes: pt counseled     ELevated blood glucose: pt counseled  -check HgA1c- 4.9            During hospitalization, the patient was " encouraged to go to both groups and individual counseling. Patient was monitored for any side effects. A meeting was held with multidisciplinary team prior to discharge and pt's diagnosis, current medications, and follow up were discussed. The patient has been compliant with treatment and can adequately attend to activities of daily living in an independent manner. The patient denies any side effects. The patient denies SI, HI, plan or intent for self harm or harm to others. The patient is no longer a danger to self or others nor gravely disabled disabled. Patient discharged  in stable condition with scheduled outpatient follow up.      Discussed diagnosis, risks and benefits of proposed treatment vs alternative treatments vs no treatment, and potential side effects of these treatments.  The patient expresses understanding of the above and displays the capacity to agree with this treatment given said understanding.  Patient also agrees that, currently, the benefits outweigh the risks and would like to pursue treatment at this time.      Discharge MSE: stated age, casually dressed, well groomed.  No psychomotor agitation or retardation.  No abnormal involuntary movements.  Gait normal.  Speech normal, conversational.  Language fluent English. Mood fine.  Affect normal range, pleasant, euthymic.  Thought process linear.  Associations intact.  Denies suicidal or homicidal ideation.  Denies auditory hallucinations, paranoid ideation, ideas of reference.  Memory intact.  Attention intact.  Fund of knowledge intact.  Insight intact.  Judgment intact.  Alert and oriented to person, place, time.      Tobacco Usage:  Is patient a smoker? No  Does patient want prescription for Tobacco Cessation? No  Does patient want counseling for Tobacco Cessation? No    If patient would like to quit, then over the counter nicotine patch could be used. The patient could also follow up with his PCP or psychiatric provider for other  alternatives.     Final Diagnoses:    Principal Problem: MDD, recurrent, severe without psychotic features    Secondary Diagnoses:     OCD  Unspecified anxiety disorder (panic attacks)     Psychosocial stressors      IUP- approximately 35 weeks     Elevated liver enzymes  ELevated blood glucose        Labs:  Admission on 01/29/2024   Component Date Value Ref Range Status    Hemoglobin A1C 01/29/2024 4.9  4.0 - 5.6 % Final    Estimated Avg Glucose 01/29/2024 94  68 - 131 mg/dL Final    Cholesterol 01/29/2024 270 (H)  120 - 199 mg/dL Final    Triglycerides 01/29/2024 199 (H)  30 - 150 mg/dL Final    HDL 01/29/2024 58  40 - 75 mg/dL Final    LDL Cholesterol 01/29/2024 172.2 (H)  63.0 - 159.0 mg/dL Final    HDL/Cholesterol Ratio 01/29/2024 21.5  20.0 - 50.0 % Final    Total Cholesterol/HDL Ratio 01/29/2024 4.7  2.0 - 5.0 Final    Non-HDL Cholesterol 01/29/2024 212  mg/dL Final   Admission on 01/29/2024, Discharged on 01/29/2024   Component Date Value Ref Range Status    WBC 01/29/2024 7.56  3.90 - 12.70 K/uL Final    RBC 01/29/2024 3.92 (L)  4.00 - 5.40 M/uL Final    Hemoglobin 01/29/2024 11.6 (L)  12.0 - 16.0 g/dL Final    Hematocrit 01/29/2024 34.3 (L)  37.0 - 48.5 % Final    MCV 01/29/2024 88  82 - 98 fL Final    MCH 01/29/2024 29.6  27.0 - 31.0 pg Final    MCHC 01/29/2024 33.8  32.0 - 36.0 g/dL Final    RDW 01/29/2024 12.8  11.5 - 14.5 % Final    Platelets 01/29/2024 344  150 - 450 K/uL Final    MPV 01/29/2024 10.1  9.2 - 12.9 fL Final    Immature Granulocytes 01/29/2024 0.4  0.0 - 0.5 % Final    Gran # (ANC) 01/29/2024 5.6  1.8 - 7.7 K/uL Final    Immature Grans (Abs) 01/29/2024 0.03  0.00 - 0.04 K/uL Final    Lymph # 01/29/2024 1.3  1.0 - 4.8 K/uL Final    Mono # 01/29/2024 0.6  0.3 - 1.0 K/uL Final    Eos # 01/29/2024 0.0  0.0 - 0.5 K/uL Final    Baso # 01/29/2024 0.02  0.00 - 0.20 K/uL Final    nRBC 01/29/2024 0  0 /100 WBC Final    Gran % 01/29/2024 73.8 (H)  38.0 - 73.0 % Final    Lymph % 01/29/2024 17.6 (L)   18.0 - 48.0 % Final    Mono % 01/29/2024 7.8  4.0 - 15.0 % Final    Eosinophil % 01/29/2024 0.1  0.0 - 8.0 % Final    Basophil % 01/29/2024 0.3  0.0 - 1.9 % Final    Differential Method 01/29/2024 Automated   Final    Sodium 01/29/2024 138  136 - 145 mmol/L Final    Potassium 01/29/2024 3.9  3.5 - 5.1 mmol/L Final    Chloride 01/29/2024 107  95 - 110 mmol/L Final    CO2 01/29/2024 19 (L)  23 - 29 mmol/L Final    Glucose 01/29/2024 116 (H)  70 - 110 mg/dL Final    BUN 01/29/2024 9  6 - 20 mg/dL Final    Creatinine 01/29/2024 0.7  0.5 - 1.4 mg/dL Final    Calcium 01/29/2024 9.0  8.7 - 10.5 mg/dL Final    Total Protein 01/29/2024 6.3  6.0 - 8.4 g/dL Final    Albumin 01/29/2024 2.6 (L)  3.5 - 5.2 g/dL Final    Total Bilirubin 01/29/2024 0.6  0.1 - 1.0 mg/dL Final    Alkaline Phosphatase 01/29/2024 98  55 - 135 U/L Final    AST 01/29/2024 50 (H)  10 - 40 U/L Final    ALT 01/29/2024 90 (H)  10 - 44 U/L Final    eGFR 01/29/2024 >60  >60 mL/min/1.73 m^2 Final    Anion Gap 01/29/2024 12  8 - 16 mmol/L Final    TSH 01/29/2024 1.210  0.400 - 4.000 uIU/mL Final    Specimen UA 01/29/2024 Urine, Clean Catch   Final    Color, UA 01/29/2024 Luna  Yellow, Straw, Luna Final    Appearance, UA 01/29/2024 Hazy (A)  Clear Final    pH, UA 01/29/2024 7.0  5.0 - 8.0 Final    Specific Gravity, UA 01/29/2024 >=1.030 (A)  1.005 - 1.030 Final    Protein, UA 01/29/2024 1+ (A)  Negative Final    Glucose, UA 01/29/2024 Trace (A)  Negative Final    Ketones, UA 01/29/2024 3+ (A)  Negative Final    Bilirubin (UA) 01/29/2024 1+ (A)  Negative Final    Occult Blood UA 01/29/2024 Trace (A)  Negative Final    Nitrite, UA 01/29/2024 Negative  Negative Final    Urobilinogen, UA 01/29/2024 1.0  <2.0 EU/dL Final    Leukocytes, UA 01/29/2024 Trace (A)  Negative Final    Benzodiazepines 01/29/2024 Negative  Negative Final    Methadone metabolites 01/29/2024 Negative  Negative Final    Cocaine (Metab.) 01/29/2024 Negative  Negative Final    Opiate Scrn, Ur  01/29/2024 Negative  Negative Final    Barbiturate Screen, Ur 01/29/2024 Negative  Negative Final    Amphetamine Screen, Ur 01/29/2024 Negative  Negative Final    THC 01/29/2024 Negative  Negative Final    Phencyclidine 01/29/2024 Negative  Negative Final    Creatinine, Urine 01/29/2024 260.7  15.0 - 325.0 mg/dL Final    Toxicology Information 01/29/2024 SEE COMMENT   Final    Alcohol, Serum 01/29/2024 <10  <10 mg/dL Final    Acetaminophen (Tylenol), Serum 01/29/2024 <3.0 (L)  10.0 - 20.0 ug/mL Final    Preg Test, Ur 01/29/2024 Positive (A)   Final    RBC, UA 01/29/2024 2  0 - 4 /hpf Final    WBC, UA 01/29/2024 11 (H)  0 - 5 /hpf Final    Bacteria 01/29/2024 Moderate (A)  None-Occ /hpf Final    Squam Epithel, UA 01/29/2024 50  /hpf Final    Hyaline Casts, UA 01/29/2024 0  0-1/lpf /lpf Final    Microscopic Comment 01/29/2024 SEE COMMENT   Final    Urine Culture, Routine 01/29/2024 Multiple organisms isolated. None in predominance.  Repeat if   Final    Urine Culture, Routine 01/29/2024 clinically necessary.   Final         Discharged Condition: stable and improved; not currently a danger to self/others or gravely disabled    Disposition: Home or Self Care    Is patient being discharged on multiple neuroleptics? No    Follow Up/Patient Instructions:     Take all medications as prescribed.  Attend all psychiatric and medical follow up appointments.   Abstain from all drugs and alcohol.  Call the crisis line at: [phone] for help in a crisis and emergent situations or call 911 and Return to ED for any acute worsening of your condition including suicidal or homicidal ideations      Discharge Procedure Orders   Diet Adult Regular     Notify your health care provider if you experience any of the following:  temperature >100.4     Notify your health care provider if you experience any of the following:  persistent nausea and vomiting or diarrhea     Notify your health care provider if you experience any of the  following:   Order Comments: Suicidal thoughts, homicidal thoughts, or any other changes in mental status  If you would like immediate help/crisis counseling, please call [phone] (TALK). Through this toll-free phone number for a network of crisis centers across the country. These centers staff their lines with people who are trained to listen and offer support to people in emotional crisis. If you are in an emergency, please call 911.     Notify your health care provider if you experience any of the following:  increased confusion or weakness     Notify your health care provider if you experience any of the following:  persistent dizziness, light-headedness, or visual disturbances     Activity as tolerated      Follow-up Information       [facility] Follow up on 2/14/2024.    Why: Follow up with your scheduled OB-GYN appointment    with Dr. Jean Pierre Peralse  on 2/14/2024 at 0930.   [address] [address]  Phone [phone].  Contact information:  [address] [address]  Phone [phone]             [facility] Go on 2/13/2024.    Why: as scheduled on 2/13/2024 at 9:30am for, Outpatient Psych Services  Contact information:  [address]   [place] [address] [phone]   [phone] fx                             Follow up apt: see above      Medications:  Reconciled Home Medications:      Medication List        START taking these medications      sertraline 100 MG tablet  Commonly known as: ZOLOFT  Take 1.5 tablets (150 mg total) by mouth once daily.  Start taking on: February 10, 2024                  Psychotherapy:  Target symptoms: depression, anxiety   Why chosen therapy is appropriate versus another modality: relevant to diagnosis  Outcome monitoring methods: self-report  Therapeutic intervention type: supportive psychotherapy  Topics discussed/themes: building skills sets for symptom management, symptom  recognition  The patient's response to the intervention is accepting. The patient's progress toward treatment goals is good.   Duration of intervention: 16 minutes.          Diet: regular     Activity as tolerated    Total time spent discharging patient: 35 minutes    Loi Gibbs III, MD  Psychiatry

## 2024-02-09 NOTE — NURSING
Pt sleeping at this time, slept 3.5 hrs with no awakenings. NAD. Resp even and unlabored.Pathways clear,bed in low position. Q 15 min safety check ongoing.All precautions maintained.

## 2024-02-09 NOTE — PLAN OF CARE
Following POC.  Makes needs known.  Denies SI/HI, AVH.  Out on the unit part of the evening.  States she feels about the same.  Told staff she would like to have her baby in this hospital.  Appropriate interactions with staff and peers.  Appetite is good.  Medication compliance.  Encouraged adherence out pt  to POC.  Free of fall.

## 2024-02-09 NOTE — CARE UPDATE
[facility]  Encounter Date: 2024  8:30 PM    Discharge Date No discharge date for patient encounter.   Hospital Account: [id number]    MRN: [id number]   Guarantor: LILLIAN ROSE   Contact Serial #: [id number]         ENCOUNTER             Patient Class: IP Psych   Unit: [facility]*   Hospital Service: Psychiatry   Bed: 211   Admitting Provider: Ray Sumner Md   Referring Physician: Clement Jeffries   Attending Provider: Ray Sumner Md   Adm Diagnosis: Depression [F32.A]      PATIENT                 Name: LILLIAN ROSE : 1995 (28 yrs)   [address] Sex: Female   City: [place] [address]       Primary Care Provider: Naa, Primary Doctor         Primary Phone: [phone]   EMERGENCY CONTACT   Contact Name Legal Guardian? Relationship to Patient Home Phone Work Phone Mobile Phone   1. Ray Rose  2. *No Contact Specified* No    Spouse              [phone]      GUARANTOR                  Guarantor: LILLIAN ROSE       : 1995   Address: [facility] [address]    Sex: [place] [address] Guarantor  Type: B/H   Relation to Patient: Self         Home Phone: [phone]   Guarantor ID: [id number]         Work Phone:     GUARANTOR EMPLOYER     Employer:             Status: UNKNOWN      COVERAGE          PRIMARY INSURANCE   Payor / Plan: [place]       Group Number:         Subscriber Name: LILLIAN ROSE Subscriber : 1995   Subscriber ID: [id number] Pat. Rel. to Subscriber: Self   Insurance Address: [address] [place] [id number]       SECONDARY INSURANCE   Payor / Plan: - No Secondary Coverage -       Group Number:         Subscriber Name:   Subscriber :     Subscriber ID:   Pat. Rel. to Subscriber:     Insurance Address:            Contact Serial # ([id number])         2024    Chart ID ([id number])

## 2024-02-09 NOTE — PSYCH
[facility]  Encounter Date: 2024  8:30 PM    Discharge Date No discharge date for patient encounter.   Hospital Account: [id number]    MRN: [id number]   Guarantor: LILLIAN ROSE   Contact Serial #: [id number]         ENCOUNTER             Patient Class: IP Psych   Unit: [facility]*   Hospital Service: Psychiatry   Bed: 211   Admitting Provider: Ray Sumner Md   Referring Physician: Clement Jeffries   Attending Provider: Ray Sumner Md   Adm Diagnosis: Depression [F32.A]      PATIENT                 Name: LILLIAN ROSE : 1995 (28 yrs)   [address] Sex: Female   City: [place] [address]       Primary Care Provider: Naa, Primary Doctor         Primary Phone: [phone]   EMERGENCY CONTACT   Contact Name Legal Guardian? Relationship to Patient Home Phone Work Phone Mobile Phone   1. Ray Rose  2. *No Contact Specified* No    Spouse              [phone]      GUARANTOR                  Guarantor: LILLIAN ROSE       : 1995   Address: [facility] [address]    [place]: [place] [address] Guarantor  Type: B/H   Relation to Patient: Self         Home Phone: [phone]   Guarantor ID: [id number]         Work Phone:     GUARANTOR EMPLOYER     Employer:             Status: UNKNOWN      COVERAGE          PRIMARY INSURANCE   Payor / Plan: [place]       Group Number:         Subscriber Name: LILLIAN ROSE Subscriber : 1995   Subscriber ID: [id number] Pat. Rel. to Subscriber: Self   Insurance Address: [address] [place] [id number]       SECONDARY INSURANCE   Payor / Plan: - No Secondary Coverage -       Group Number:         Subscriber Name:   Subscriber :     Subscriber ID:   Pat. Rel. to Subscriber:     Insurance Address:            Contact Serial # ([id number])         2024    Chart ID ([id number])

## 2024-02-09 NOTE — PLAN OF CARE
Patient reports nervous excitement regarding discharge.  Denies intentions to harm self and/or others at this time. Denies auditory and/or visual hallucinations.  Affect and emotion congruent with situation. Interacts appropriately with peers and staff.  Accepts meals and medications.    All belongings accounted for and will be physically given to patient upon discharge.  Patient denies intentions to harm self and/or others at this time.  No acute distress noted. Will discharge to  home via own vehicle.  Patient educated on discharge plan, aftercare appointments, and medications.

## 2024-02-09 NOTE — DISCHARGE INSTRUCTIONS
Discharge documents explained and given to patient for reference. Patient voiced understanding regarding medications and follow up appointments.

## 2024-02-12 NOTE — PSYCH
The patient has transitioned to outpatient treatment. Several outpatient  appointments have been scheduled,  [facility] , [address], [address], [place]. [address], [phone]. An appointment has been scheduled on 2/15/2024 at 9:30 am. In addition an appointment has been scheduled with her established provider, at [facility], [address], [address], [place] [address], [phone].  An appointment has been scheduled on 2/13/2024 at 9:30 am. The AVS was faxed on 2/12/2024 at 9:35 am.